# Patient Record
Sex: MALE | Race: WHITE | NOT HISPANIC OR LATINO | Employment: FULL TIME | URBAN - METROPOLITAN AREA
[De-identification: names, ages, dates, MRNs, and addresses within clinical notes are randomized per-mention and may not be internally consistent; named-entity substitution may affect disease eponyms.]

---

## 2017-03-01 ENCOUNTER — ALLSCRIPTS OFFICE VISIT (OUTPATIENT)
Dept: OTHER | Facility: OTHER | Age: 32
End: 2017-03-01

## 2017-03-22 ENCOUNTER — TRANSCRIBE ORDERS (OUTPATIENT)
Dept: ADMINISTRATIVE | Facility: HOSPITAL | Age: 32
End: 2017-03-22

## 2017-03-22 DIAGNOSIS — E27.9 UNSPECIFIED DISORDER OF ADRENAL GLANDS: Primary | ICD-10-CM

## 2017-03-31 ENCOUNTER — HOSPITAL ENCOUNTER (OUTPATIENT)
Dept: CT IMAGING | Facility: HOSPITAL | Age: 32
Discharge: HOME | End: 2017-03-31
Payer: COMMERCIAL

## 2017-03-31 ENCOUNTER — GENERIC CONVERSION - ENCOUNTER (OUTPATIENT)
Dept: OTHER | Facility: OTHER | Age: 32
End: 2017-03-31

## 2017-03-31 DIAGNOSIS — E27.9 UNSPECIFIED DISORDER OF ADRENAL GLANDS: ICD-10-CM

## 2017-03-31 PROCEDURE — 74170 CT ABD WO CNTRST FLWD CNTRST: CPT

## 2017-03-31 RX ADMIN — IOHEXOL 100 ML: 350 INJECTION, SOLUTION INTRAVENOUS at 18:31

## 2017-10-17 ENCOUNTER — GENERIC CONVERSION - ENCOUNTER (OUTPATIENT)
Dept: OTHER | Facility: OTHER | Age: 32
End: 2017-10-17

## 2017-12-29 ENCOUNTER — ALLSCRIPTS OFFICE VISIT (OUTPATIENT)
Dept: OTHER | Facility: OTHER | Age: 32
End: 2017-12-29

## 2017-12-31 ENCOUNTER — GENERIC CONVERSION - ENCOUNTER (OUTPATIENT)
Dept: OTHER | Facility: OTHER | Age: 32
End: 2017-12-31

## 2018-01-01 NOTE — PROGRESS NOTES
Assessment   1  Acute bronchitis (466 0) (J20 9)    Plan   Acute bronchitis    · Amoxicillin-Pot Clavulanate 500-125 MG Oral Tablet; TAKE 1 TABLET EVERY 12    HOURS DAILY   · Promethazine-Codeine 6 25-10 MG/5ML Oral Syrup; TAKE 5 ML EVERY 4 TO 6    HOURS AS NEEDED FOR COUGH  GERD (gastroesophageal reflux disease)    · Pantoprazole Sodium 40 MG Oral Tablet Delayed Release    Discussion/Summary      Acute bronchitis- antibiotics prescribed  cough medication prescribed  Possible side effects of new medications were reviewed with the patient/guardian today  The treatment plan was reviewed with the patient/guardian  The patient/guardian understands and agrees with the treatment plan      Chief Complaint   Pt here for persisting cough with phlegm, chest congestion, headaches  History of Present Illness   Cold Symptoms:    Nyla Lay presents with complaints of sudden onset of constant episodes of moderate cold symptoms  Episodes started 3 weeks ago  His symptoms are caused by work contact with URI  Symptoms are improved by OTC cold medications  Symptoms are worsening  Associated symptoms include productive cough,-- facial pressure,-- headache,-- ear pain,-- wheezing-- and-- shortness of breath, but-- no nasal congestion,-- no post nasal drainage,-- no sore throat,-- no weakness,-- no nausea,-- no vomiting,-- no diarrhea,-- no fever-- and-- no chills  HPI: when he laying down he can hear crackling  Cough is worse at night  having large amounts of mucus  SOB with laying down  Review of Systems        Constitutional: as noted in HPI       ENT: no sore throat  Cardiovascular: no chest pain  Respiratory: cough  ROS reviewed  Active Problems   1  Adrenal nodule (255 8) (E27 9)   2  Change in bowel habits (787 99) (R19 4)   3  Degeneration of lumbar or lumbosacral intervertebral disc (722 52) (M51 37)   4  Flu vaccine need (V04 81) (Z23)   5   GERD (gastroesophageal reflux disease) (530 81) (K21 9)   6  Need for Tdap vaccination (V06 1) (Z23)   7  Other chronic pain (338 29) (G89 29)   8  Urinary urgency (788 63) (R39 15)    Past Medical History   1  History of Chronic low back pain (724 2,338 29) (M54 5,G89 29)   2  History of Diverticulosis (562 10) (K57 90)   3  History of Hematochezia (578 1) (K92 1)   4  History of hemorrhoids (V13 89) (Z87 19)   5  History of otitis media (V12 49) (Z86 69)  Active Problems And Past Medical History Reviewed: The active problems and past medical history were reviewed and updated today  Family History   Mother    1  Family history of sarcoidosis (V19 8) (Z83 2)   2  Family history of Uterine Cancer (V16 49)  Father    3  Family history of Diagnosis unknown  Paternal Grandmother    3  Family history of Breast Cancer (V16 3)  Maternal Aunt    5  Family history of Ovarian Cancer (V16 41)  Paternal Aunt    10  Family history of Breast Cancer (V16 3)  Family History Reviewed: The family history was reviewed and updated today  Social History    · Full-time employment   ·    · Never A Smoker   · Never Drank Alcohol  The social history was reviewed and is unchanged  Surgical History   1  History of Complete Colonoscopy   2  Denied: History of Recent Surgery  Surgical History Reviewed: The surgical history was reviewed and updated today  Current Meds    1  Pantoprazole Sodium 40 MG Oral Tablet Delayed Release; take 1 tablet every day; Therapy: [id number] to (467 20 767)  Requested for: 05Apr2017; Last     Rx:05Apr2017 Ordered   2  Vicodin ES 7 5-300 MG Oral Tablet; take 1 tablet every 4 to 6 hours as needed for pain; Therapy: 39PID9442 to (Evaluate:02Jan2018); Last Rx:[id number] Ordered     The medication list was reviewed and updated today  Allergies   1   No Known Drug Allergies    Vitals    Recorded: [id number] 01:38PM   Temperature 96 7 F, Temporal   Heart Rate 92   Respiration 16   Systolic 815, Sitting Diastolic 90, Sitting   Height 6 ft    Weight 320 lb    BMI Calculated 43 4   BSA Calculated 2 6     Physical Exam        Constitutional      General appearance: Abnormal   acutely ill-- and-- appears tired  Ears, Nose, Mouth, and Throat      Otoscopic examination: Tympanic membrance translucent with normal light reflex  Canals patent without erythema  Nasal mucosa, septum, and turbinates: Abnormal        Oropharynx: Abnormal   The posterior pharynx was erythematous  Pulmonary      Respiratory effort: No increased work of breathing or signs of respiratory distress  Auscultation of lungs: Abnormal   rhonchi over both apices  diminished breath sounds over the right base  Psychiatric      Orientation to person, place and time: Normal        Mood and affect: Normal           Signatures    Electronically signed by : Radha Looney; Dec 29 2017  1:56PM EST                       (Author)     Electronically signed by :  Antwon Valladares MD; Dec 31 2017  7:57PM EST                       (Author)

## 2018-01-03 ENCOUNTER — HOSPITAL ENCOUNTER (OUTPATIENT)
Dept: RADIOLOGY | Facility: HOSPITAL | Age: 33
Discharge: HOME | End: 2018-01-03
Payer: COMMERCIAL

## 2018-01-03 ENCOUNTER — GENERIC CONVERSION - ENCOUNTER (OUTPATIENT)
Dept: OTHER | Facility: OTHER | Age: 33
End: 2018-01-03

## 2018-01-03 DIAGNOSIS — R05.9 COUGH: ICD-10-CM

## 2018-01-03 DIAGNOSIS — J20.9 ACUTE BRONCHITIS: ICD-10-CM

## 2018-01-03 PROCEDURE — 71046 X-RAY EXAM CHEST 2 VIEWS: CPT

## 2018-01-10 NOTE — MISCELLANEOUS
[employer]        To whom it may concern;        Mr Tico Wheeler is under my care for chronic back pain  His work restrictions are no lifting greater than 45 lbs, limited bending/pushing and carrying items for prolonged period of time  No snow shoveling and needs to take breaks as needed for alleviation  of pain  Your assistance in this matter is greatly appreciated  Electronically signed by: Felicia Narayanan MD  Oct 18 2017  8:19AM EST

## 2018-01-12 VITALS
HEIGHT: 72 IN | DIASTOLIC BLOOD PRESSURE: 102 MMHG | HEART RATE: 85 BPM | SYSTOLIC BLOOD PRESSURE: 122 MMHG | OXYGEN SATURATION: 98 % | WEIGHT: 315 LBS | RESPIRATION RATE: 18 BRPM | TEMPERATURE: 97 F | BODY MASS INDEX: 42.66 KG/M2

## 2018-01-13 NOTE — RESULT NOTES
Verified Results  (1) COMPREHENSIVE METABOLIC PANEL [id number] 90:71XC [facility]     Test Name Result Flag Reference   GLUCOSE 102 mg/dL H 65-99   Fasting reference interval   UREA NITROGEN (BUN) 15 mg/dL  7-25   CREATININE 0 79 mg/dL  0 60-1 35   eGFR NON-AFR   AMERICAN 120 mL/min/1 73m2  > OR = 60   eGFR AFRICAN AMERICAN 139 mL/min/1 73m2  > OR = 60   BUN/CREATININE RATIO   7-28   NOT APPLICABLE (calc)   SODIUM 140 mmol/L  135-146   POTASSIUM 4 5 mmol/L  3 5-5 3   CHLORIDE 104 mmol/L     CARBON DIOXIDE 28 mmol/L  19-30   CALCIUM 9 5 mg/dL  8 6-10 3   PROTEIN, TOTAL 7 3 g/dL  6 1-8 1   ALBUMIN 4 6 g/dL  3 6-5 1   GLOBULIN 2 7 g/dL (calc)  1 9-3 7   ALBUMIN/GLOBULIN RATIO 1 7 (calc)  1 0-2 5   BILIRUBIN, TOTAL 0 5 mg/dL  0 2-1 2   ALKALINE PHOSPHATASE 67 U/L     AST 24 U/L  10-40   ALT 39 U/L  9-46     (1) ANGIOTENSIN CONVERSIN ENZYME 21Mar2016 08:51AM [facility]     Test Name Result Flag Reference   ANGIOTENSIN-1-CONVERTING$ENZYME 34 U/L  9-67     (1) CBC/PLT/DIFF [id number] 08:51AM [facility]     Test Name Result Flag Reference   WHITE BLOOD CELL COUNT 6 3 Thousand/uL  3 8-10 8   RED BLOOD CELL COUNT 6 02 Million/uL H 4 20-5 80   HEMOGLOBIN 17 0 g/dL  13 2-17 1   HEMATOCRIT 51 6 % H 38 5-50 0   MCV 85 7 fL  80 0-100 0   MCH 28 2 pg  27 0-33 0   MCHC 33 0 g/dL  32 0-36 0   RDW 13 6 %  11 0-15 0   PLATELET COUNT 991 Thousand/uL  140-400   MPV 8 8 fL  7 5-11 5   ABSOLUTE NEUTROPHILS 3749 cells/uL  4211-9520   ABSOLUTE LYMPHOCYTES 2054 cells/uL  850-3900   ABSOLUTE MONOCYTES 410 cells/uL  200-950   ABSOLUTE EOSINOPHILS 44 cells/uL     ABSOLUTE BASOPHILS 44 cells/uL  0-200   NEUTROPHILS 59 5 %     LYMPHOCYTES 32 6 %     MONOCYTES 6 5 %     EOSINOPHILS 0 7 %     BASOPHILS 0 7 %       (Q) LIPID PANEL WITH REFLEX TO DIRECT LDL [id number] 08:51AM Mardee Finder     Test Name Result Flag Reference   CHOLESTEROL, TOTAL 169 mg/dL  125-200   HDL CHOLESTEROL 39 mg/dL L > OR = 40   TRIGLICERIDES 72 mg/dL <150   LDL-CHOLESTEROL 116 mg/dL (calc)  <130   Desirable range <100 mg/dL for patients with CHD or  diabetes and <70 mg/dL for diabetic patients with  known heart disease  CHOL/HDLC RATIO 4 3 (calc)  < OR = 5 0   NON HDL CHOLESTEROL 130 mg/dL (calc)     Target for non-HDL cholesterol is 30 mg/dL higher than   LDL cholesterol target       (Q) TSH, 3RD GENERATION [id number] 08:51AM Debrah Ahumada   REPORT COMMENT:  FASTING:YES     Test Name Result Flag Reference   TSH 1 50 mIU/L  0 40-4 50

## 2018-01-17 NOTE — RESULT NOTES
Verified Results  CT ABDOMEN W [address] [facility] [id number] 05:39PM [facility]     Test Name Result Flag Reference   CT ABDOMEN W WO CONTRAST (Report)     CT ABDOMEN - ADRENAL PROTOCOL - WITH AND WITHOUT IV CONTRAST     INDICATION: Evaluate left adrenal lesion  COMPARISON: CT scan 9/1/2015     TECHNIQUE: Thin section noncontrast CT examination of the abdomen was performed per adrenal mass protocol  Scan were performed prior to IV contrast administration, in postcontrast portal venous phase, and at 10 minute delay  This examination, like all    CT scans performed in the [facility], was performed utilizing techniques to minimize radiation dose exposure, including the use of iterative reconstruction and automated exposure control  Axial, sagittal, and coronal reformatted images   were submitted for interpretation  Rad dose 2448 mGy-cm      IV Contrast: 100 mL of iohexol (OMNIPAQUE)     Enteric Contrast: Enteric contrast was administered  FINDINGS:     ADRENAL GLANDS:    There is a left adrenal nodule measuring 2 5 x 2 3 cm unchanged  The lesion demonstrates homogeneous attenuation and circumscribed margins  Unenhanced density is -5 Hounsfield units  Parenchymal phase density measures 17 Hounsfield units  Delayed phase density measures -2 Hounsfield units  Findings consistent with stable benign left adrenal adenoma  ABDOMEN     LOWER CHEST: No significant abnormality in the lung bases  LIVER/BILIARY TREE: Unremarkable  GALLBLADDER: No calcified gallstones  No pericholecystic inflammatory change  SPLEEN: Stable splenomegaly measuring 14 5 cm  PANCREAS: Unremarkable  KIDNEYS/URETERS: Unremarkable  No hydronephrosis  VISUALIZED STOMACH AND BOWEL: Mild thickening of the duodenal wall  ABDOMINAL CAVITY: No free air, free fluid or adenopathy  VESSELS:   No aneurysm  OSSEOUS STRUCTURES: No destructive osseous lesion         IMPRESSION:     Stable benign left adrenal gland adenoma  No follow-up is necessary  Mild thickening of the duodenal wall  Clinical correlation for duodenitis is recommended  ##sigslh##sigslh       Workstation performed: [id number]     Signed by:    Manoj Salmeron MD   4/4/17

## 2018-01-23 VITALS
BODY MASS INDEX: 42.66 KG/M2 | RESPIRATION RATE: 16 BRPM | DIASTOLIC BLOOD PRESSURE: 90 MMHG | SYSTOLIC BLOOD PRESSURE: 132 MMHG | HEART RATE: 92 BPM | WEIGHT: 315 LBS | HEIGHT: 72 IN | TEMPERATURE: 96.7 F

## 2018-01-23 NOTE — RESULT NOTES
Verified Results  * XR CHEST PA & LATERAL [id number] 12:41PM Reynaldo Brito Order Number: [id number]     Test Name Result Flag Reference   XR CHEST PA & LATERAL (Report)     CHEST 2 View     INDICATION: Shortness of breath, chest tightness, cough and fever for a week  COMPARISON: None     VIEWS: PA and lateral projections; 2 images     FINDINGS:        Cardiomediastinal silhouette appears unremarkable  The lungs are clear  No pneumothorax or pleural effusion  Visualized osseous structures appear within normal limits for the patient's age  IMPRESSION:     No active pulmonary disease  Workstation performed: [id number]     Signed by:    Adan Romero MD   1/4/18

## 2018-02-09 ENCOUNTER — OFFICE VISIT (OUTPATIENT)
Dept: FAMILY MEDICINE CLINIC | Facility: CLINIC | Age: 33
End: 2018-02-09
Payer: COMMERCIAL

## 2018-02-09 VITALS
BODY MASS INDEX: 41.33 KG/M2 | WEIGHT: 305.13 LBS | HEART RATE: 92 BPM | RESPIRATION RATE: 16 BRPM | SYSTOLIC BLOOD PRESSURE: 126 MMHG | DIASTOLIC BLOOD PRESSURE: 90 MMHG | TEMPERATURE: 97.5 F | HEIGHT: 72 IN

## 2018-02-09 DIAGNOSIS — M51.37 DEGENERATION OF INTERVERTEBRAL DISC OF LUMBOSACRAL REGION: Primary | ICD-10-CM

## 2018-02-09 DIAGNOSIS — B37.0 THRUSH, ORAL: ICD-10-CM

## 2018-02-09 DIAGNOSIS — M51.37 DDD (DEGENERATIVE DISC DISEASE), LUMBOSACRAL: ICD-10-CM

## 2018-02-09 DIAGNOSIS — A37.90 WHOOPING COUGH: Primary | ICD-10-CM

## 2018-02-09 DIAGNOSIS — M51.37 DEGENERATION OF INTERVERTEBRAL DISC OF LUMBOSACRAL REGION: ICD-10-CM

## 2018-02-09 PROBLEM — R05.9 COUGH: Status: ACTIVE | Noted: 2018-01-03

## 2018-02-09 PROCEDURE — 99214 OFFICE O/P EST MOD 30 MIN: CPT | Performed by: FAMILY MEDICINE

## 2018-02-09 PROCEDURE — 3008F BODY MASS INDEX DOCD: CPT | Performed by: FAMILY MEDICINE

## 2018-02-09 RX ORDER — PREDNISONE 20 MG/1
20 TABLET ORAL DAILY
Qty: 10 TABLET | Refills: 0 | Status: SHIPPED | OUTPATIENT
Start: 2018-02-09 | End: 2018-05-08 | Stop reason: ALTCHOICE

## 2018-02-09 RX ORDER — BENZONATATE 100 MG/1
100 CAPSULE ORAL 3 TIMES DAILY PRN
COMMUNITY
End: 2018-05-08 | Stop reason: ALTCHOICE

## 2018-02-09 RX ORDER — PANTOPRAZOLE SODIUM 40 MG/1
1 TABLET, DELAYED RELEASE ORAL DAILY
COMMUNITY
Start: 2017-04-05 | End: 2018-05-08 | Stop reason: ALTCHOICE

## 2018-02-09 RX ORDER — ALBUTEROL SULFATE 90 UG/1
2 AEROSOL, METERED RESPIRATORY (INHALATION) EVERY 6 HOURS PRN
COMMUNITY
End: 2019-07-26 | Stop reason: SDUPTHER

## 2018-02-09 RX ORDER — HYDROCODONE BITARTRATE AND ACETAMINOPHEN 7.5; 3 MG/1; MG/1
1 TABLET ORAL EVERY 6 HOURS PRN
Qty: 100 TABLET | Refills: 0 | Status: SHIPPED | OUTPATIENT
Start: 2018-02-09 | End: 2018-04-23 | Stop reason: SDUPTHER

## 2018-02-09 RX ORDER — HYDROCODONE BITARTRATE AND ACETAMINOPHEN 7.5; 3 MG/1; MG/1
1 TABLET ORAL
COMMUNITY
Start: 2013-04-09 | End: 2018-02-09 | Stop reason: SDUPTHER

## 2018-02-09 RX ORDER — CLARITHROMYCIN 500 MG/1
500 TABLET, COATED ORAL EVERY 12 HOURS SCHEDULED
Qty: 20 TABLET | Refills: 0 | Status: SHIPPED | OUTPATIENT
Start: 2018-02-09 | End: 2018-02-19

## 2018-02-09 RX ORDER — HYDROCODONE BITARTRATE AND ACETAMINOPHEN 7.5; 3 MG/1; MG/1
1 TABLET ORAL EVERY 8 HOURS PRN
Qty: 180 TABLET | Refills: 0 | Status: SHIPPED | OUTPATIENT
Start: 2018-02-09 | End: 2018-02-09 | Stop reason: SDUPTHER

## 2018-02-09 NOTE — PROGRESS NOTES
Assessment/Plan:    DDD (degenerative disc disease), lumbosacral  Would like referral to pain management per workman's comp       Diagnoses and all orders for this visit:    Whooping cough  -     clarithromycin (BIAXIN) 500 mg tablet; Take 1 tablet (500 mg total) by mouth every 12 (twelve) hours for 10 days  -     predniSONE 20 mg tablet; Take 1 tablet (20 mg total) by mouth daily    Thrush, oral  -     nystatin (MYCOSTATIN) 100,000 units/mL suspension; Apply 5 mL (500,000 Units total) to the mouth or throat 4 (four) times a day    DDD (degenerative disc disease), lumbosacral    Other orders  -     HYDROcodone-acetaminophen (VICODIN ES) 7 5-300 MG per tablet; Take 1 tablet by mouth  -     pantoprazole (PROTONIX) 40 mg tablet; Take 1 tablet by mouth daily  -     benzonatate (TESSALON PERLES) 100 mg capsule; Take 100 mg by mouth 3 (three) times a day as needed for cough  -     albuterol (PROVENTIL HFA,VENTOLIN HFA) 90 mcg/act inhaler; Inhale 2 puffs every 6 (six) hours as needed for wheezing          Subjective:      Patient ID: Maureen Mcmahan is a 34 y o  male  Cough   This is a chronic problem  The current episode started more than 1 month ago  The problem has been gradually worsening  The problem occurs every few minutes  The cough is non-productive  Associated symptoms include nasal congestion, postnasal drip, a sore throat and shortness of breath  Pertinent negatives include no chills, ear congestion, ear pain, fever, headaches, heartburn or hemoptysis  Associated symptoms comments: Headaches and sore throat from coughing  The symptoms are aggravated by cold air  Risk factors for lung disease include 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)  He has tried a beta-agonist inhaler and prescription cough suppressant (antibiotics) for the symptoms  The treatment provided mild relief         The following portions of the patient's history were reviewed and updated as appropriate: allergies, current medications, past family history, past medical history, past social history, past surgical history and problem list     Review of Systems   Constitutional: Positive for unexpected weight change  Negative for chills and fever  15 lb   HENT: Positive for postnasal drip and sore throat  Negative for ear pain  Respiratory: Positive for cough and shortness of breath  Negative for hemoptysis  Gastrointestinal: Negative for abdominal pain and heartburn  Musculoskeletal: Positive for back pain  Neurological: Negative for headaches  Hematological: Negative for adenopathy  Objective:    Vitals:    02/09/18 1434   BP: 126/90   Pulse: 92   Resp: 16   Temp: 97 5 °F (36 4 °C)        Physical Exam   Constitutional: He appears well-developed and well-nourished  HENT:   Right Ear: External ear normal    Left Ear: External ear normal    Mouth/Throat: Oropharynx is clear and moist    Oral thrush   Cardiovascular: Normal rate and regular rhythm  Pulmonary/Chest: He has wheezes  Scattered expiratory wheezes   Lymphadenopathy:     He has no cervical adenopathy

## 2018-02-26 ENCOUNTER — TELEPHONE (OUTPATIENT)
Dept: FAMILY MEDICINE CLINIC | Facility: CLINIC | Age: 33
End: 2018-02-26

## 2018-02-26 NOTE — TELEPHONE ENCOUNTER
Pt stated all he needs for the hydrocodone is your last few office visit notes for back pain faxed over to insurance company since they dont have any resent documentation  Faxed [phone], phone number [phone] ext [phone]

## 2018-02-26 NOTE — TELEPHONE ENCOUNTER
If not better needs to be re-evaluated, reason why he started hydrocodone might even be in paper records but we were not his workman's comp doc at that time

## 2018-02-26 NOTE — TELEPHONE ENCOUNTER
I called patient because we received correspondence from his workers comp claim that they need documentation when and why he started the hydrocodone  I need Kwesi Morejon to access [employer] to see when he started  Pt still complains he is not feeling better  He finished the abx, steriods, and still w/ chest discomfort and cough and thrush has not cleared up  Does he need to give it time to clear up? Pls advise

## 2018-03-12 ENCOUNTER — TELEPHONE (OUTPATIENT)
Dept: FAMILY MEDICINE CLINIC | Facility: CLINIC | Age: 33
End: 2018-03-12

## 2018-03-12 NOTE — TELEPHONE ENCOUNTER
Spoke w/ patient regarding his wc claim   Info is below     Claim#[id number]  Date- 07/14/2010  - mary schultz w/ [address]  Direct [phone]  Toll free- [phone] ext [phone]

## 2018-03-13 ENCOUNTER — HOSPITAL ENCOUNTER (OUTPATIENT)
Dept: MRI IMAGING | Facility: HOSPITAL | Age: 33
Discharge: HOME | End: 2018-03-13
Payer: OTHER MISCELLANEOUS

## 2018-03-13 DIAGNOSIS — M51.37 DDD (DEGENERATIVE DISC DISEASE), LUMBOSACRAL: ICD-10-CM

## 2018-03-13 PROCEDURE — 72148 MRI LUMBAR SPINE W/O DYE: CPT

## 2018-03-14 ENCOUNTER — TELEPHONE (OUTPATIENT)
Dept: FAMILY MEDICINE CLINIC | Facility: CLINIC | Age: 33
End: 2018-03-14

## 2018-03-14 NOTE — TELEPHONE ENCOUNTER
Patient notified         ----- Message from Louis Lai MD sent at 3/14/2018 12:47 PM EDT -----  Herniated lumbar disc -about the same as before, perhaps slightly smaller

## 2018-04-23 DIAGNOSIS — M51.37 DEGENERATION OF INTERVERTEBRAL DISC OF LUMBOSACRAL REGION: ICD-10-CM

## 2018-04-23 RX ORDER — HYDROCODONE BITARTRATE AND ACETAMINOPHEN 7.5; 3 MG/1; MG/1
1 TABLET ORAL EVERY 6 HOURS PRN
Qty: 100 TABLET | Refills: 0 | Status: SHIPPED | OUTPATIENT
Start: 2018-04-23 | End: 2018-08-08 | Stop reason: SDUPTHER

## 2018-05-08 ENCOUNTER — CONSULT (OUTPATIENT)
Dept: PAIN MEDICINE | Facility: MEDICAL CENTER | Age: 33
End: 2018-05-08
Payer: OTHER MISCELLANEOUS

## 2018-05-08 VITALS
DIASTOLIC BLOOD PRESSURE: 88 MMHG | RESPIRATION RATE: 14 BRPM | HEART RATE: 72 BPM | WEIGHT: 314.2 LBS | BODY MASS INDEX: 42.56 KG/M2 | HEIGHT: 72 IN | SYSTOLIC BLOOD PRESSURE: 136 MMHG

## 2018-05-08 DIAGNOSIS — M51.37 DDD (DEGENERATIVE DISC DISEASE), LUMBOSACRAL: Primary | ICD-10-CM

## 2018-05-08 DIAGNOSIS — M51.26 LUMBAR DISC HERNIATION: ICD-10-CM

## 2018-05-08 PROCEDURE — 99244 OFF/OP CNSLTJ NEW/EST MOD 40: CPT | Performed by: PHYSICAL MEDICINE & REHABILITATION

## 2018-05-08 NOTE — LETTER
May 8, 2018     Cale Larson, [address]    Patient: Leonardo Carl   YOB: 1985   Date of Visit: 5/8/2018       Dear Dr Briscoe Sic:    Thank you for referring Leonardo Carl to me for evaluation  Below are my notes for this consultation  If you have questions, please do not hesitate to call me  I look forward to following your patient along with you  Sincerely,        Ji Shen DO        CC: No Recipients  Ji Shen DO  5/8/2018 11:48 AM  Sign at close encounter  Assessment:  1  DDD (degenerative disc disease), lumbosacral    2  Lumbar disc herniation        Plan:   The patient has been experiencing axial back pain since a work injury which occurred on July 14, 2010 per his report  He states he slipped and twisted and hurt his back at that time  An MRI from 2012 demonstrates L4-5 and L5-S1 disc herniations  An updated MRI demonstrates some improvement in the size of these herniations  He has no compression on the exiting nerve roots at these levels and is experiencing mainly axial pain  Surgery is typically not rewarding for these types of complaints and likely would not be recommended to him  I did review with him recommendations for future care  These are as follows:    1  He may use occasional over-the-counter NSAIDs and Tylenol to manage his pain complaints  He should not be managed long-term on opioid medications  He has been using Vicodin for quite a while and this should be discontinued  Another option would be to consider a trial of duloxetine  2   Strongly recommend weight loss whether this is through diet restriction and exercise or referral for medical weight loss management strategies  3   Could consider return to physical therapy  4   We also discussed the option of vocational training to look for a different type of work that is more sedentary    Clearly 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 in the future  5   I discussed with him that he should follow up with me with any worsening of symptoms or any radiating leg pain for which we may consider epidural steroid injections  Currently these are not indicated as he is only experiencing axial pain  My impressions and treatment recommendations were discussed in detail with the patient who verbalized understanding and had no further questions  Discharge instructions were provided  I personally saw and examined the patient and I agree with the above discussed plan of care  History of Present Illness:    Jason Schilling is a 28 y o  male  Sent from Dr Joyce Sheridan for further evaluation and management of his chronic back pain complaints which have been present since he injured his back at work on July 14, 2010  He states that he slipped and twisted his back  He had undergone care at [facility] as well as [facility]  He states he underwent facet joint injections and there was some discussion about radiofrequency ablation but he did not undergo that  He was not offered epidural steroid injections based on lack of radicular symptoms  He did meet with the surgeon as well who did not recommend any surgical treatment  He currently describes moderate pain rated as a 3 to 5/10 which is nearly constant without any typical pattern and is characterized as shooting, numbness, sharp, dull, aching, pins and needles  He localizes this to the lower lumbar axial spine without radiation  Aggravating factors include bending, exercise, coughing, sneezing, and bowel movements  He states that he did not have any relief with injections and felt that that actually made his pain worse  He did have some moderate relief with a TENS unit and local heat or ice application  Currently using hydrocodone up to 4 per day which she states does provide some relief of his pain  He is aware that this is a poor option for managing the pain chronically      I have personally reviewed and/or updated the patient's past medical history, past surgical history, family history, social history, current medications, allergies, and vital signs today  Review of Systems:    Review of Systems   Constitutional: Negative for fever and unexpected weight change  HENT: Negative for trouble swallowing  Eyes: Negative for visual disturbance  Respiratory: Negative for shortness of breath and wheezing  Cardiovascular: Negative for chest pain and palpitations  Gastrointestinal: Negative for constipation, diarrhea, nausea and vomiting  Endocrine: Negative for cold intolerance, heat intolerance and polydipsia  Genitourinary: Negative for difficulty urinating and frequency  Musculoskeletal: Positive for back pain and gait problem  Negative for arthralgias, joint swelling and myalgias  Skin: Negative for rash  Neurological: Negative for dizziness, seizures, syncope, weakness and headaches  Hematological: Does not bruise/bleed easily  Psychiatric/Behavioral: Negative for dysphoric mood  All other systems reviewed and are negative  Patient Active Problem List   Diagnosis    Cough    DDD (degenerative disc disease), lumbosacral    GERD (gastroesophageal reflux disease)    Urinary urgency       Past Medical History:   Diagnosis Date    Back pain        No past surgical history on file      Family History   Problem Relation Age of Onset    Mental illness Mother     No Known Problems Father     Diabetes type II Maternal Grandmother     Vision loss Maternal Grandmother     Heart disease Maternal Grandfather        Social History     Occupational History          Social History Main Topics    Smoking status: Never Smoker    Smokeless tobacco: Never Used    Alcohol use No    Drug use: No    Sexual activity: Yes     Partners: Female       Current Outpatient Prescriptions on File Prior to Visit   Medication Sig    albuterol (PROVENTIL HFA,VENTOLIN HFA) 90 mcg/act inhaler Inhale 2 puffs every 6 (six) hours as needed for wheezing    HYDROcodone-acetaminophen (VICODIN ES) 7 5-300 MG per tablet Take 1 tablet by mouth every 6 (six) hours as needed for moderate pain Max Daily Amount: 4 tablets    [DISCONTINUED] benzonatate (TESSALON PERLES) 100 mg capsule Take 100 mg by mouth 3 (three) times a day as needed for cough    [DISCONTINUED] nystatin (MYCOSTATIN) 100,000 units/mL suspension Apply 5 mL (500,000 Units total) to the mouth or throat 4 (four) times a day    [DISCONTINUED] pantoprazole (PROTONIX) 40 mg tablet Take 1 tablet by mouth daily    [DISCONTINUED] predniSONE 20 mg tablet Take 1 tablet (20 mg total) by mouth daily     No current facility-administered medications on file prior to visit  No Known Allergies    Physical Exam:    /88   Pulse 72   Resp 14   Ht 6' (1 829 m)   Wt (!) 143 kg (314 lb 3 2 oz)   BMI 42 61 kg/m²        LUMBAR  General: Well-developed, well-nourished individual in no acute distress  Mental: Appropriate mood and affect  Grossly oriented with coherent speech and thought processing   Neuro:  Cranial nerves: Cranial nerve function is grossly intact bilaterally   Strength: Bilateral lower extremity strength is normal and symmetric   No atrophy or tone abnormalities noted   Reflexes: Bilateral lower extremity muscle stretch reflexes are physiologic and symmetric   No ankle clonus is noted   Sensation: No loss of sensation is noted   SLR/Foraminal Compression Maneuvers: Straight leg raising in the sitting position is negative for radicular pain   Gait:  Gait/gross motor: Gait is normal  Station is normal  Toe walking, heel walking  are normal     Musculoskeletal:  Palpation: Inspection and palpation of the spine and extremities are unremarkable   Spine: Normal pain-free range of motion except for end range forward flexion which reproduces pain  No gross axial skeletal deformities       Skin: Skin inspection grossly negative for erythema, breakdown, or concerning lesions in affected area   Lymph: No lymphadenopathy is appreciated in the involved extremity   Vessels: No lower extremity edema   Lungs: Breathing is comfortable and regular  No dyspnea noted during examination   Eyes: Visual field grossly intact to confrontation  No redness appreciated  ENT: No craniofacial deformities or asymmetry  No neck masses appreciated  Imaging    MRI LUMBAR SPINE WITHOUT CONTRAST     INDICATION:  39-year-old male, chronic low back pain; remote trauma     COMPARISON:  12/22/2012 outside MRI     TECHNIQUE:  Sagittal T1, sagittal T2, sagittal inversion recovery, axial T1 and axial T2, coronal T2        IMAGE QUALITY:  Diagnostic     FINDINGS:     ALIGNMENT:  Normal alignment of the lumbar spine  No compression fracture  No spondylolysis or spondylolisthesis  No scoliosis      MARROW SIGNAL:  Normal marrow signal is identified within the visualized bony structures  No discrete marrow lesion      DISTAL CORD AND CONUS:  Normal size and signal within the distal cord and conus  The conus ends at the L1 level      PARASPINAL SOFT TISSUES:  Paraspinal soft tissues are unremarkable      SACRUM:  Normal signal within the sacrum  No evidence of insufficiency or stress fracture      LOWER THORACIC DISC SPACES:  Normal disc height and signal   No disc herniation, canal stenosis or foraminal narrowing      LUMBAR DISC SPACES:     L1-L2:  Normal      L2-L3:  Normal      L3-L4:  Normal      L4-L5:  Mild degenerative disc and facet disease, moderate left-sided disc herniation with probable left L5 nerve root encroachment  The disc herniation is slightly smaller than previous      L5-S1:  Mild degenerative disc and facet disease, moderate left-sided disc herniation with probable left S1 nerve root encroachment    The disc herniation appears slightly smaller than previous      IMPRESSION:  Persistent mild degenerative spondylosis L4-5 and L5-S1     Moderate sized left-sided disc herniations L4-5 and L5-S1, slightly smaller than previous

## 2018-05-08 NOTE — PROGRESS NOTES
Assessment:  1  DDD (degenerative disc disease), lumbosacral    2  Lumbar disc herniation        Plan:   The patient has been experiencing axial back pain since a work injury which occurred on July 14, 2010 per his report  He states he slipped and twisted and hurt his back at that time  An MRI from 2012 demonstrates L4-5 and L5-S1 disc herniations  An updated MRI demonstrates some improvement in the size of these herniations  He has no compression on the exiting nerve roots at these levels and is experiencing mainly axial pain  Surgery is typically not rewarding for these types of complaints and likely would not be recommended to him  I did review with him recommendations for future care  These are as follows:    1  He may use occasional over-the-counter NSAIDs and Tylenol to manage his pain complaints  He should not be managed long-term on opioid medications  He has been using Vicodin for quite a while and this should be discontinued  Another option would be to consider a trial of duloxetine  2   Strongly recommend weight loss whether this is through diet restriction and exercise or referral for medical weight loss management strategies  3   Could consider return to physical therapy  4   We also discussed the option of vocational training to look for a different type of work that is more sedentary  Clearly 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 in the future  5   I discussed with him that he should follow up with me with any worsening of symptoms or any radiating leg pain for which we may consider epidural steroid injections  Currently these are not indicated as he is only experiencing axial pain  My impressions and treatment recommendations were discussed in detail with the patient who verbalized understanding and had no further questions  Discharge instructions were provided   I personally saw and examined the patient and I agree with the above discussed plan of care       History of Present Illness:    Rosmery Cooley is a 28 y o  male  Sent from Dr Ulices Mosqueda for further evaluation and management of his chronic back pain complaints which have been present since he injured his back at work on July 14, 2010  He states that he slipped and twisted his back  [facility]  He states he underwent facet joint injections and there was some discussion about radiofrequency ablation but he did not undergo that  He was not offered epidural steroid injections based on lack of radicular symptoms  He did meet with the surgeon as well who did not recommend any surgical treatment  He currently describes moderate pain rated as a 3 to 5/10 which is nearly constant without any typical pattern and is characterized as shooting, numbness, sharp, dull, aching, pins and needles  He localizes this to the lower lumbar axial spine without radiation  Aggravating factors include bending, exercise, coughing, sneezing, and bowel movements  He states that he did not have any relief with injections and felt that that actually made his pain worse  He did have some moderate relief with a TENS unit and local heat or ice application  Currently using hydrocodone up to 4 per day which she states does provide some relief of his pain  He is aware that this is a poor option for managing the pain chronically  I have personally reviewed and/or updated the patient's past medical history, past surgical history, family history, social history, current medications, allergies, and vital signs today  Review of Systems:    Review of Systems   Constitutional: Negative for fever and unexpected weight change  HENT: Negative for trouble swallowing  Eyes: Negative for visual disturbance  Respiratory: Negative for shortness of breath and wheezing  Cardiovascular: Negative for chest pain and palpitations     Gastrointestinal: Negative for constipation, diarrhea, nausea and vomiting  Endocrine: Negative for cold intolerance, heat intolerance and polydipsia  Genitourinary: Negative for difficulty urinating and frequency  Musculoskeletal: Positive for back pain and gait problem  Negative for arthralgias, joint swelling and myalgias  Skin: Negative for rash  Neurological: Negative for dizziness, seizures, syncope, weakness and headaches  Hematological: Does not bruise/bleed easily  Psychiatric/Behavioral: Negative for dysphoric mood  All other systems reviewed and are negative  Patient Active Problem List   Diagnosis    Cough    DDD (degenerative disc disease), lumbosacral    GERD (gastroesophageal reflux disease)    Urinary urgency       Past Medical History:   Diagnosis Date    Back pain        No past surgical history on file      Family History   Problem Relation Age of Onset    Mental illness Mother     No Known Problems Father     Diabetes type II Maternal Grandmother     Vision loss Maternal Grandmother     Heart disease Maternal Grandfather        Social History     Occupational History          Social History Main Topics    Smoking status: Never Smoker    Smokeless tobacco: Never Used    Alcohol use No    Drug use: No    Sexual activity: Yes     Partners: Female       Current Outpatient Prescriptions on File Prior to Visit   Medication Sig    albuterol (PROVENTIL HFA,VENTOLIN HFA) 90 mcg/act inhaler Inhale 2 puffs every 6 (six) hours as needed for wheezing    HYDROcodone-acetaminophen (VICODIN ES) 7 5-300 MG per tablet Take 1 tablet by mouth every 6 (six) hours as needed for moderate pain Max Daily Amount: 4 tablets    [DISCONTINUED] benzonatate (TESSALON PERLES) 100 mg capsule Take 100 mg by mouth 3 (three) times a day as needed for cough    [DISCONTINUED] nystatin (MYCOSTATIN) 100,000 units/mL suspension Apply 5 mL (500,000 Units total) to the mouth or throat 4 (four) times a day    [DISCONTINUED] pantoprazole (PROTONIX) 40 mg tablet Take 1 tablet by mouth daily    [DISCONTINUED] predniSONE 20 mg tablet Take 1 tablet (20 mg total) by mouth daily     No current facility-administered medications on file prior to visit  No Known Allergies    Physical Exam:    /88   Pulse 72   Resp 14   Ht 6' (1 829 m)   Wt (!) 143 kg (314 lb 3 2 oz)   BMI 42 61 kg/m²       LUMBAR  General: Well-developed, well-nourished individual in no acute distress  Mental: Appropriate mood and affect  Grossly oriented with coherent speech and thought processing   Neuro:  Cranial nerves: Cranial nerve function is grossly intact bilaterally   Strength: Bilateral lower extremity strength is normal and symmetric   No atrophy or tone abnormalities noted   Reflexes: Bilateral lower extremity muscle stretch reflexes are physiologic and symmetric   No ankle clonus is noted   Sensation: No loss of sensation is noted   SLR/Foraminal Compression Maneuvers: Straight leg raising in the sitting position is negative for radicular pain   Gait:  Gait/gross motor: Gait is normal  Station is normal  Toe walking, heel walking  are normal     Musculoskeletal:  Palpation: Inspection and palpation of the spine and extremities are unremarkable   Spine: Normal pain-free range of motion except for end range forward flexion which reproduces pain  No gross axial skeletal deformities   Skin: Skin inspection grossly negative for erythema, breakdown, or concerning lesions in affected area   Lymph: No lymphadenopathy is appreciated in the involved extremity   Vessels: No lower extremity edema   Lungs: Breathing is comfortable and regular  No dyspnea noted during examination   Eyes: Visual field grossly intact to confrontation  No redness appreciated  ENT: No craniofacial deformities or asymmetry  No neck masses appreciated           Imaging    MRI LUMBAR SPINE WITHOUT CONTRAST     INDICATION:  34year-old male, chronic low back pain; remote trauma     COMPARISON:  12/22/2012 outside MRI     TECHNIQUE:  Sagittal T1, sagittal T2, sagittal inversion recovery, axial T1 and axial T2, coronal T2        IMAGE QUALITY:  Diagnostic     FINDINGS:     ALIGNMENT:  Normal alignment of the lumbar spine  No compression fracture  No spondylolysis or spondylolisthesis  No scoliosis      MARROW SIGNAL:  Normal marrow signal is identified within the visualized bony structures  No discrete marrow lesion      DISTAL CORD AND CONUS:  Normal size and signal within the distal cord and conus  The conus ends at the L1 level      PARASPINAL SOFT TISSUES:  Paraspinal soft tissues are unremarkable      SACRUM:  Normal signal within the sacrum  No evidence of insufficiency or stress fracture      LOWER THORACIC DISC SPACES:  Normal disc height and signal   No disc herniation, canal stenosis or foraminal narrowing      LUMBAR DISC SPACES:     L1-L2:  Normal      L2-L3:  Normal      L3-L4:  Normal      L4-L5:  Mild degenerative disc and facet disease, moderate left-sided disc herniation with probable left L5 nerve root encroachment  The disc herniation is slightly smaller than previous      L5-S1:  Mild degenerative disc and facet disease, moderate left-sided disc herniation with probable left S1 nerve root encroachment    The disc herniation appears slightly smaller than previous      IMPRESSION:  Persistent mild degenerative spondylosis L4-5 and L5-S1     Moderate sized left-sided disc herniations L4-5 and L5-S1, slightly smaller than previous

## 2018-08-08 DIAGNOSIS — M51.37 DEGENERATION OF INTERVERTEBRAL DISC OF LUMBOSACRAL REGION: ICD-10-CM

## 2018-08-08 RX ORDER — HYDROCODONE BITARTRATE AND ACETAMINOPHEN 7.5; 3 MG/1; MG/1
1 TABLET ORAL EVERY 6 HOURS PRN
Qty: 100 TABLET | Refills: 0 | Status: SHIPPED | OUTPATIENT
Start: 2018-08-08 | End: 2018-10-03 | Stop reason: SDUPTHER

## 2018-08-08 NOTE — TELEPHONE ENCOUNTER
Pt called for med refill for his HYDROcodone-acetaminophen 7 5-300mg   Pt last ov 2/9/18 last bw 3/21/16

## 2018-09-19 ENCOUNTER — TELEPHONE (OUTPATIENT)
Dept: FAMILY MEDICINE CLINIC | Facility: CLINIC | Age: 33
End: 2018-09-19

## 2018-09-19 NOTE — TELEPHONE ENCOUNTER
Pt needs a note for work restrictions due to back injury pt will like to know if you can redo the same letter form last year or will he have to come in for a re eval please advise

## 2018-10-03 ENCOUNTER — OFFICE VISIT (OUTPATIENT)
Dept: FAMILY MEDICINE CLINIC | Facility: CLINIC | Age: 33
End: 2018-10-03
Payer: OTHER MISCELLANEOUS

## 2018-10-03 VITALS
HEART RATE: 68 BPM | BODY MASS INDEX: 42.18 KG/M2 | HEIGHT: 72 IN | SYSTOLIC BLOOD PRESSURE: 128 MMHG | WEIGHT: 311.38 LBS | DIASTOLIC BLOOD PRESSURE: 92 MMHG | RESPIRATION RATE: 18 BRPM | TEMPERATURE: 98 F

## 2018-10-03 DIAGNOSIS — M51.37 DDD (DEGENERATIVE DISC DISEASE), LUMBOSACRAL: Primary | ICD-10-CM

## 2018-10-03 DIAGNOSIS — M51.37 DEGENERATION OF INTERVERTEBRAL DISC OF LUMBOSACRAL REGION: ICD-10-CM

## 2018-10-03 PROBLEM — IMO0002 DEGENERATION OF INTERVERTEBRAL DISC: Status: RESOLVED | Noted: 2018-10-03 | Resolved: 2018-10-03

## 2018-10-03 PROBLEM — IMO0002 DEGENERATION OF INTERVERTEBRAL DISC: Status: ACTIVE | Noted: 2018-10-03

## 2018-10-03 PROBLEM — R05.9 COUGH: Status: RESOLVED | Noted: 2018-01-03 | Resolved: 2018-10-03

## 2018-10-03 PROCEDURE — 99213 OFFICE O/P EST LOW 20 MIN: CPT | Performed by: FAMILY MEDICINE

## 2018-10-03 PROCEDURE — 80361 OPIATES 1 OR MORE: CPT | Performed by: FAMILY MEDICINE

## 2018-10-03 PROCEDURE — G0480 DRUG TEST DEF 1-7 CLASSES: HCPCS | Performed by: FAMILY MEDICINE

## 2018-10-03 RX ORDER — HYDROCODONE BITARTRATE AND ACETAMINOPHEN 7.5; 3 MG/1; MG/1
1 TABLET ORAL EVERY 6 HOURS PRN
Qty: 100 TABLET | Refills: 0 | Status: SHIPPED | OUTPATIENT
Start: 2018-10-03 | End: 2019-01-17 | Stop reason: SDUPTHER

## 2018-10-03 RX ORDER — CYCLOBENZAPRINE HCL 5 MG
5 TABLET ORAL 3 TIMES DAILY PRN
Qty: 60 TABLET | Refills: 2 | Status: SHIPPED | OUTPATIENT
Start: 2018-10-03 | End: 2019-11-05 | Stop reason: SDUPTHER

## 2018-10-03 NOTE — LETTER
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

## 2018-10-03 NOTE — PROGRESS NOTES
Assessment/Plan:    DDD (degenerative disc disease), lumbosacral  Refill meds, needs urine drug screen and new pain managemnt       Diagnoses and all orders for this visit:    DDD (degenerative disc disease), lumbosacral  -     Hydrocodone and Metabolites, Urine    Degeneration of intervertebral disc of lumbosacral region  -     HYDROcodone-acetaminophen (VICODIN ES) 7 5-300 MG per tablet; Take 1 tablet by mouth every 6 (six) hours as needed for moderate pain Max Daily Amount: 4 tablets  -     Hydrocodone and Metabolites, Urine          Subjective:      Patient ID: Ernst Malone is a 28 y o  male  F/u chronic back pain, needs refill on med and work note with restrictions, pain is stable on meds but still gets pain-? Whether adding muscle relaxer would help        The following portions of the patient's history were reviewed and updated as appropriate: allergies, current medications, past family history, past medical history, past social history, past surgical history and problem list     Review of Systems   Constitutional: Positive for activity change and unexpected weight change  Negative for appetite change  Lost 3 lb   Respiratory: Negative for shortness of breath  Cardiovascular: Negative for chest pain  Musculoskeletal: Positive for back pain  Neurological: Negative for numbness  Objective:      /92 (BP Location: Right arm, Patient Position: Sitting, Cuff Size: Standard)   Pulse 68   Temp 98 °F (36 7 °C) (Temporal)   Resp 18   Ht 6' (1 829 m)   Wt (!) 141 kg (311 lb 6 oz)   BMI 42 23 kg/m²          Physical Exam   Constitutional: He appears well-developed and well-nourished  Neck: No thyromegaly present  Cardiovascular: Normal rate and regular rhythm  Pulmonary/Chest: Breath sounds normal    Musculoskeletal: He exhibits tenderness  Lumbar back: He exhibits decreased range of motion, tenderness and spasm  Vitals reviewed

## 2018-10-11 ENCOUNTER — TELEPHONE (OUTPATIENT)
Dept: FAMILY MEDICINE CLINIC | Facility: CLINIC | Age: 33
End: 2018-10-11

## 2018-10-11 LAB
HYDROCODONE UR QL: NEGATIVE NG/ML
HYDROMORPHONE UR QL: NEGATIVE NG/ML

## 2018-10-11 NOTE — TELEPHONE ENCOUNTER
----- Message from Braeden Hernández MD sent at 10/11/2018  3:33 PM EDT -----  Urine negative for rxed meds-when was last dose before urine drug screen?

## 2018-10-12 DIAGNOSIS — M51.36 DEGENERATIVE LUMBAR DISC: Primary | ICD-10-CM

## 2018-10-12 PROCEDURE — G0480 DRUG TEST DEF 1-7 CLASSES: HCPCS | Performed by: FAMILY MEDICINE

## 2018-10-12 PROCEDURE — 80361 OPIATES 1 OR MORE: CPT | Performed by: FAMILY MEDICINE

## 2018-10-12 NOTE — TELEPHONE ENCOUNTER
Per patient he normally takes his medication every day at 9:00pm before bed time and he might take 1 during the day if needed  Patient also mentioned he takes 1-2 gallons of water a day

## 2018-10-19 ENCOUNTER — TELEPHONE (OUTPATIENT)
Dept: FAMILY MEDICINE CLINIC | Facility: CLINIC | Age: 33
End: 2018-10-19

## 2018-10-19 LAB
HYDROCODONE UR QL: 567 NG/ML
HYDROMORPHONE UR QL: 81 NG/ML

## 2018-10-19 NOTE — TELEPHONE ENCOUNTER
----- Message from Remy Rodriguez MD sent at 10/19/2018  2:14 PM EDT -----  Urine consistent with rxed meds

## 2019-01-17 DIAGNOSIS — M51.37 DEGENERATION OF INTERVERTEBRAL DISC OF LUMBOSACRAL REGION: ICD-10-CM

## 2019-01-17 RX ORDER — HYDROCODONE BITARTRATE AND ACETAMINOPHEN 7.5; 3 MG/1; MG/1
1 TABLET ORAL EVERY 6 HOURS PRN
Qty: 100 TABLET | Refills: 0 | Status: SHIPPED | OUTPATIENT
Start: 2019-01-17 | End: 2019-07-01 | Stop reason: SDUPTHER

## 2019-01-17 NOTE — TELEPHONE ENCOUNTER
Patient called in requesting medication refill on his Vicodin to be sent o [facility] on [address]    Last ov was 10/3/2018

## 2019-06-28 DIAGNOSIS — M51.37 DEGENERATION OF INTERVERTEBRAL DISC OF LUMBOSACRAL REGION: ICD-10-CM

## 2019-06-28 RX ORDER — HYDROCODONE BITARTRATE AND ACETAMINOPHEN 7.5; 3 MG/1; MG/1
1 TABLET ORAL EVERY 6 HOURS PRN
Qty: 100 TABLET | Refills: 0 | OUTPATIENT
Start: 2019-06-28

## 2019-07-01 DIAGNOSIS — M51.37 DEGENERATION OF INTERVERTEBRAL DISC OF LUMBOSACRAL REGION: ICD-10-CM

## 2019-07-01 RX ORDER — HYDROCODONE BITARTRATE AND ACETAMINOPHEN 7.5; 3 MG/1; MG/1
1 TABLET ORAL EVERY 6 HOURS PRN
Qty: 60 TABLET | Refills: 0 | Status: SHIPPED | OUTPATIENT
Start: 2019-07-01 | End: 2019-11-05 | Stop reason: SDUPTHER

## 2019-07-01 NOTE — TELEPHONE ENCOUNTER
Patient scheduled an appointment on 7/26/2019  Patient stated if he needs to give urine he will be out of his meds by then  I offered and earlier appointment but he cant take off from work

## 2019-07-26 ENCOUNTER — OFFICE VISIT (OUTPATIENT)
Dept: FAMILY MEDICINE CLINIC | Facility: CLINIC | Age: 34
End: 2019-07-26
Payer: OTHER MISCELLANEOUS

## 2019-07-26 ENCOUNTER — TELEPHONE (OUTPATIENT)
Dept: FAMILY MEDICINE CLINIC | Facility: CLINIC | Age: 34
End: 2019-07-26

## 2019-07-26 VITALS
DIASTOLIC BLOOD PRESSURE: 94 MMHG | HEART RATE: 88 BPM | BODY MASS INDEX: 42.66 KG/M2 | HEIGHT: 72 IN | WEIGHT: 315 LBS | RESPIRATION RATE: 18 BRPM | TEMPERATURE: 98.7 F | SYSTOLIC BLOOD PRESSURE: 132 MMHG

## 2019-07-26 DIAGNOSIS — E66.01 MORBID OBESITY WITH BMI OF 40.0-44.9, ADULT (HCC): ICD-10-CM

## 2019-07-26 DIAGNOSIS — J98.01 BRONCHOSPASM: ICD-10-CM

## 2019-07-26 DIAGNOSIS — M51.37 DDD (DEGENERATIVE DISC DISEASE), LUMBOSACRAL: Primary | ICD-10-CM

## 2019-07-26 PROCEDURE — 99213 OFFICE O/P EST LOW 20 MIN: CPT | Performed by: FAMILY MEDICINE

## 2019-07-26 RX ORDER — GABAPENTIN 300 MG/1
CAPSULE ORAL
COMMUNITY
End: 2019-07-26 | Stop reason: CLARIF

## 2019-07-26 RX ORDER — PIROXICAM 10 MG/1
CAPSULE ORAL
COMMUNITY
End: 2019-07-26 | Stop reason: CLARIF

## 2019-07-26 RX ORDER — ALBUTEROL SULFATE 90 UG/1
2 AEROSOL, METERED RESPIRATORY (INHALATION) EVERY 6 HOURS PRN
Qty: 1 INHALER | Refills: 5 | Status: SHIPPED | OUTPATIENT
Start: 2019-07-26 | End: 2020-08-11

## 2019-07-26 NOTE — PATIENT INSTRUCTIONS
Continue meds, UDS and pain management contract [facility]  Obesity   AMBULATORY CARE:   Obesity  is when your body mass index (BMI) is greater than 30  Your healthcare provider will use your height and weight to measure your BMI  The risks of obesity include  many health problems, such as injuries or physical disability  You may need tests to check for the following:  · Diabetes     · High blood pressure or high cholesterol     · Heart disease     · Gallbladder or liver disease     · Cancer of the colon, breast, prostate, liver, or kidney     · Sleep apnea     · Arthritis or gout  Seek care immediately if:   · You have a severe headache, confusion, or difficulty speaking  · You have weakness on one side of your body  · You have chest pain, sweating, or shortness of breath  Contact your healthcare provider if:   · You have symptoms of gallbladder or liver disease, such as pain in your upper abdomen  · You have knee or hip pain and discomfort while walking  · You have symptoms of diabetes, such as intense hunger and thirst, and frequent urination  · You have symptoms of sleep apnea, such as snoring or daytime sleepiness  · You have questions or concerns about your condition or care  Treatment for obesity  focuses on helping you lose weight to improve your health  Even a small decrease in BMI can reduce the risk for many health problems  Your healthcare provider will help you set a weight-loss goal   · Lifestyle changes  are the first step in treating obesity  These include making healthy food choices and getting regular physical activity  Your healthcare provider may suggest a weight-loss program that involves coaching, education, and therapy  · Medicine  may help you lose weight when it is used with a healthy diet and physical activity  · Surgery  can help you lose weight if you are very obese and have other health problems  There are several types of weight-loss surgery   Ask your healthcare provider for more information  Be successful losing weight:   · Set small, realistic goals  An example of a small goal is to walk for 20 minutes 5 days a week  Anther goal is to lose 5% of your body weight  · Tell friends, family members, and coworkers about your goals  and ask for their support  Ask a friend to lose weight with you, or join a weight-loss support group  · Identify foods or triggers that may cause you to overeat , and find ways to avoid them  Remove tempting high-calorie foods from your home and workplace  Place a bowl of fresh fruit on your kitchen counter  If stress causes you to eat, then find other ways to cope with stress  · Keep a diary to track what you eat and drink  Also write down how many minutes of physical activity you do each day  Weigh yourself once a week and record it in your diary  Eating changes: You will need to eat 500 to 1,000 fewer calories each day than you currently eat to lose 1 to 2 pounds a week  The following changes will help you cut calories:  · Eat smaller portions  Use small plates, no larger than 9 inches in diameter  Fill your plate half full of fruits and vegetables  Measure your food using measuring cups until you know what a serving size looks like  · Eat 3 meals and 1 or 2 snacks each day  Plan your meals in advance  [employer] and eat at home most of the time  Eat slowly  · Eat fruits and vegetables at every meal   They are low in calories and high in fiber, which makes you feel full  Do not add butter, margarine, or cream sauce to vegetables  Use herbs to season steamed vegetables  · Eat less fat and fewer fried foods  Eat more baked or grilled chicken and fish  These protein sources are lower in calories and fat than red meat  Limit fast food  Dress your salads with olive oil and vinegar instead of bottled dressing  · Limit the amount of sugar you eat  Do not drink sugary beverages  Limit alcohol    Activity changes: Physical activity is good for your body in many ways  It helps you burn calories and build strong muscles  It decreases stress and depression, and improves your mood  It can also help you sleep better  Talk to your healthcare provider before you begin an exercise program   · Exercise for at least 30 minutes 5 days a week  Start slowly  Set aside time each day for physical activity that you enjoy and that is convenient for you  It is best to do both weight training and an activity that increases your heart rate, such as walking, bicycling, or swimming  · Find ways to be more active  Do yard work and housecleaning  Walk up the stairs instead of using elevators  Spend your leisure time going to events that require walking, such as outdoor festivals or fairs  This extra physical activity can help you lose weight and keep it off  Follow up with your healthcare provider as directed: You may need to meet with a dietitian  Write down your questions so you remember to ask them during your visits  © 2017 [employer] [employer] Information is for End User's use only and may not be sold, redistributed or otherwise used for commercial purposes  All illustrations and images included in CareNotes® are the copyrighted property of [employer]  or Rudi Barrios  The above information is an  only  It is not intended as medical advice for individual conditions or treatments  Talk to your doctor, nurse or pharmacist before following any medical regimen to see if it is safe and effective for you  Weight Management   AMBULATORY CARE:   Why it is important to manage your weight:  Being overweight increases your risk of health conditions such as heart disease, high blood pressure, type 2 diabetes, and certain types of cancer  It can also increase your risk for osteoarthritis, sleep apnea, and other respiratory problems  Aim for a slow, steady weight loss   Even a small amount of weight loss can lower your risk of health problems  How to lose weight safely:  A safe and healthy way to lose weight is to eat fewer calories and get regular exercise  You can lose up about 1 pound a week by decreasing the number of calories you eat by 500 calories each day  You can decrease calories by eating smaller portion sizes or by cutting out high-calorie foods  Read labels to find out how many calories are in the foods you eat  You can also burn calories with exercise such as walking, swimming, or biking  You will be more likely to keep weight off if you make these changes part of your lifestyle  Healthy meal plan for weight management:  A healthy meal plan includes a variety of foods, contains fewer calories, and helps you stay healthy  A healthy meal plan includes the following:  · Eat whole-grain foods more often  A healthy meal plan should contain fiber  Fiber is the part of grains, fruits, and vegetables that is not broken down by your body  Whole-grain foods are healthy and provide extra fiber in your diet  Some examples of whole-grain foods are whole-wheat breads and pastas, oatmeal, brown rice, and bulgur  · Eat a variety of vegetables every day  Include dark, leafy greens such as spinach, kale, brandon greens, and mustard greens  Eat yellow and orange vegetables such as carrots, sweet potatoes, and winter squash  · Eat a variety of fruits every day  Choose fresh or canned fruit (canned in its own juice or light syrup) instead of juice  Fruit juice has very little or no fiber  · Eat low-fat dairy foods  Drink fat-free (skim) milk or 1% milk  Eat fat-free yogurt and low-fat cottage cheese  Try low-fat cheeses such as mozzarella and other reduced-fat cheeses  · Choose meat and other protein foods that are low in fat  Choose beans or other legumes such as split peas or lentils  Choose fish, skinless poultry (chicken or turkey), or lean cuts of red meat (beef or pork)   Before you cook meat or poultry, cut off any visible fat  · Use less fat and oil  Try baking foods instead of frying them  Add less fat, such as margarine, sour cream, regular salad dressing and mayonnaise to foods  Eat fewer high-fat foods  Some examples of high-fat foods include french fries, doughnuts, ice cream, and cakes  · Eat fewer sweets  Limit foods and drinks that are high in sugar  This includes candy, cookies, regular soda, and sweetened drinks  Ways to decrease calories:   · Eat smaller portions  ¨ Use a small plate with smaller servings  ¨ Do not eat second helpings  ¨ When you eat at a restaurant, ask for a box and place half of your meal in the box before you eat  ¨ Share an entrée with someone else  · Replace high-calorie snacks with healthy, low-calorie snacks  ¨ Choose fresh fruit, vegetables, fat-free rice cakes, or air-popped popcorn instead of potato chips, nuts, or chocolate  ¨ Choose water or calorie-free drinks instead of soda or sweetened drinks  · Eat regular meals  Skipping meals can lead to overeating later in the day  Eat a healthy snack in place of a meal if you do not have time to eat a regular meal      · Do not shop for groceries when you are hungry  You may be more likely to make unhealthy food choices  Take a grocery list of healthy foods and shop after you have eaten  Exercise:  Exercise at least 30 minutes per day on most days of the week  Some examples of exercise include walking, biking, dancing, and swimming  You can also fit in more physical activity by taking the stairs instead of the elevator or parking farther away from stores  Ask your healthcare provider about the best exercise plan for you  Other things to consider as you try to lose weight:   · Be aware of situations that may give you the urge to overeat, such as eating while watching television  Find ways to avoid these situations  For example, read a book, go for a walk, or do crafts      · Meet with a weight loss support group or friends who are also trying to lose weight  This may help you stay motivated to continue working on your weight loss goals  © 2017 [employer] Information is for End User's use only and may not be sold, redistributed or otherwise used for commercial purposes  All illustrations and images included in CareNotes® are the copyrighted property of [employer]  or Rudi Barrios  The above information is an  only  It is not intended as medical advice for individual conditions or treatments  Talk to your doctor, nurse or pharmacist before following any medical regimen to see if it is safe and effective for you

## 2019-07-26 NOTE — PROGRESS NOTES
Assessment/Plan:    DDD (degenerative disc disease), lumbosacral  Doing well on meds       Diagnoses and all orders for this visit:    DDD (degenerative disc disease), lumbosacral    Morbid obesity with BMI of 40 0-44 9, adult ([facility] [address] )    Other orders  -     gabapentin (NEURONTIN) 300 mg capsule; gabapentin 300 mg capsule  -     piroxicam (FELDENE) 10 mg capsule; piroxicam 10 mg capsule          Subjective:      Patient ID: Cindy Arteaga is a 27 y o  male  Back Pain   This is a chronic problem  The current episode started more than 1 year ago  The problem occurs intermittently  The problem has been waxing and waning since onset  The pain is present in the lumbar spine  The quality of the pain is described as aching  The pain is at a severity of 4/10  The pain is mild  The symptoms are aggravated by twisting and bending  Associated symptoms include paresthesias  Pertinent negatives include no bladder incontinence, bowel incontinence, chest pain or numbness  (Occ numbness and shakiness in legs when has flare up) Risk factors include sedentary lifestyle and obesity  He has tried muscle relaxant and analgesics for the symptoms  The treatment provided moderate relief  The following portions of the patient's history were reviewed and updated as appropriate: allergies, current medications, past family history, past medical history, past social history, past surgical history and problem list     Review of Systems   Constitutional: Negative for activity change, appetite change and unexpected weight change  Respiratory: Positive for shortness of breath  Negative for cough  Occ shortness of breath due to chemical exposure at work   Cardiovascular: Negative for chest pain  Gastrointestinal: Negative for bowel incontinence  Genitourinary: Negative for bladder incontinence  Musculoskeletal: Positive for back pain  Neurological: Positive for paresthesias  Negative for numbness           Objective:      BP 132/94   Pulse 88   Temp 98 7 °F (37 1 °C) (Temporal)   Resp 18   Ht 6' (1 829 m)   Wt (!) 145 kg (318 lb 9 6 oz)   BMI 43 21 kg/m²          Physical Exam   Constitutional: He appears well-developed and well-nourished  Neck: No thyromegaly present  Cardiovascular: Normal rate, regular rhythm and normal heart sounds  Pulmonary/Chest: Effort normal and breath sounds normal    Musculoskeletal: He exhibits tenderness  Lumbar back: He exhibits decreased range of motion, tenderness and spasm  Lymphadenopathy:     He has no cervical adenopathy  Vitals reviewed  BMI Counseling: Body mass index is 43 21 kg/m²  Discussed the patient's BMI with him  The BMI is above average  BMI counseling and education was provided to the patient  Nutrition recommendations include reducing portion sizes, decreasing overall calorie intake, 3-5 servings of fruits/vegetables daily, reducing fast food intake and consuming healthier snacks  Exercise recommendations include exercising 3-5 times per week

## 2019-07-26 NOTE — TELEPHONE ENCOUNTER
To Whom It May Concern:   Mr Stu Shelby is under my care for chronic back pain due to a work related injury   His restrictions are indefinite and are as follows: Weight limit for lifting of no greater than 35 pounds without assist, no snow shoveling, and needs breaks as needed every 2 hours for back pain

## 2019-10-22 ENCOUNTER — TELEPHONE (OUTPATIENT)
Dept: FAMILY MEDICINE CLINIC | Facility: CLINIC | Age: 34
End: 2019-10-22

## 2019-10-22 NOTE — TELEPHONE ENCOUNTER
PATIENT CALLED IN STATED NEEDS REFILL ON "HYDROCODONE-ACETAMINOPHEN 7 5-300MG" SENT TO [facility]  PT STATED UNSURE IF HE WOULD NEED AN OV  PT LAST SEEN BY DR Brice Tavera ON 7/26/19  PT STATED THIS IS THROUGH [facility]   PT WOULD LIKE A CALL BACK -[phone]

## 2019-11-05 ENCOUNTER — OFFICE VISIT (OUTPATIENT)
Dept: FAMILY MEDICINE CLINIC | Facility: CLINIC | Age: 34
End: 2019-11-05
Payer: OTHER MISCELLANEOUS

## 2019-11-05 VITALS
HEIGHT: 72 IN | WEIGHT: 315 LBS | HEART RATE: 64 BPM | BODY MASS INDEX: 42.66 KG/M2 | SYSTOLIC BLOOD PRESSURE: 130 MMHG | DIASTOLIC BLOOD PRESSURE: 90 MMHG

## 2019-11-05 DIAGNOSIS — M51.37 DEGENERATION OF INTERVERTEBRAL DISC OF LUMBOSACRAL REGION: ICD-10-CM

## 2019-11-05 DIAGNOSIS — M51.37 DEGENERATION OF INTERVERTEBRAL DISC OF LUMBOSACRAL REGION: Primary | ICD-10-CM

## 2019-11-05 DIAGNOSIS — M51.37 DDD (DEGENERATIVE DISC DISEASE), LUMBOSACRAL: ICD-10-CM

## 2019-11-05 PROCEDURE — G0480 DRUG TEST DEF 1-7 CLASSES: HCPCS | Performed by: FAMILY MEDICINE

## 2019-11-05 PROCEDURE — 99213 OFFICE O/P EST LOW 20 MIN: CPT | Performed by: FAMILY MEDICINE

## 2019-11-05 PROCEDURE — 80361 OPIATES 1 OR MORE: CPT | Performed by: FAMILY MEDICINE

## 2019-11-05 RX ORDER — HYDROCODONE BITARTRATE AND ACETAMINOPHEN 7.5; 3 MG/1; MG/1
1 TABLET ORAL EVERY 6 HOURS PRN
Qty: 60 TABLET | Refills: 0 | Status: SHIPPED | OUTPATIENT
Start: 2019-11-05 | End: 2020-04-24 | Stop reason: SDUPTHER

## 2019-11-05 RX ORDER — CYCLOBENZAPRINE HCL 5 MG
5 TABLET ORAL 3 TIMES DAILY PRN
Qty: 60 TABLET | Refills: 2 | Status: SHIPPED | OUTPATIENT
Start: 2019-11-05 | End: 2020-09-26

## 2019-11-05 RX ORDER — DULOXETIN HYDROCHLORIDE 30 MG/1
30 CAPSULE, DELAYED RELEASE ORAL DAILY
Qty: 30 CAPSULE | Refills: 5 | Status: SHIPPED | OUTPATIENT
Start: 2019-11-05 | End: 2020-04-24 | Stop reason: CLARIF

## 2019-11-05 NOTE — ASSESSMENT & PLAN NOTE
Usually takes 1 tab/day for back pain with weather changes sometimes 2/day, had  Been given neurontin but was concerned about weight gain, does need uds and pain management contract updated, will add Cymbalta

## 2019-11-05 NOTE — PROGRESS NOTES
Assessment and Plan:    Problem List Items Addressed This Visit        Musculoskeletal and Integument    DDD (degenerative disc disease), lumbosacral     Usually takes 1 tab/day for back pain with weather changes sometimes 2/day, had  Been given neurontin but was concerned about weight gain, does need uds and pain management contract updated, will add Cymbalta         Relevant Medications    cyclobenzaprine (FLEXERIL) 5 mg tablet    DULoxetine (CYMBALTA) 30 mg delayed release capsule      Other Visit Diagnoses     Degeneration of intervertebral disc of lumbosacral region        Relevant Medications    HYDROcodone-acetaminophen (VICODIN ES) 7 5-300 MG per tablet    cyclobenzaprine (FLEXERIL) 5 mg tablet    DULoxetine (CYMBALTA) 30 mg delayed release capsule                 Diagnoses and all orders for this visit:    Degeneration of intervertebral disc of lumbosacral region  -     HYDROcodone-acetaminophen (VICODIN ES) 7 5-300 MG per tablet; Take 1 tablet by mouth every 6 (six) hours as needed for moderate painMax Daily Amount: 4 tablets  -     cyclobenzaprine (FLEXERIL) 5 mg tablet; Take 1 tablet (5 mg total) by mouth 3 (three) times a day as needed for muscle spasms  -     DULoxetine (CYMBALTA) 30 mg delayed release capsule; Take 1 capsule (30 mg total) by mouth daily    DDD (degenerative disc disease), lumbosacral  -     cyclobenzaprine (FLEXERIL) 5 mg tablet; Take 1 tablet (5 mg total) by mouth 3 (three) times a day as needed for muscle spasms  -     DULoxetine (CYMBALTA) 30 mg delayed release capsule; Take 1 capsule (30 mg total) by mouth daily              Subjective:      Patient ID: Simeon Braga is a 27 y o  male  CC:    Chief Complaint   Patient presents with    Back Pain     WC follow up to back pain    - h       HPI:    Here for f/u back pain, says pain med is barely helping, has been able to keep job, no new numbness or tingling unless does lifting,takes frequent breaks      The following portions of the patient's history were reviewed and updated as appropriate: allergies, current medications, past family history, past medical history, past social history, past surgical history and problem list      Review of Systems   Constitutional: Negative for activity change, appetite change and unexpected weight change  Respiratory: Positive for chest tightness  Negative for cough and shortness of breath  Cardiovascular: Positive for chest pain  Negative for palpitations and leg swelling  Gastrointestinal: Negative for constipation  Neurological: Positive for numbness  Psychiatric/Behavioral: Positive for sleep disturbance  The patient is not nervous/anxious  Data to review:       Objective:    Vitals:    11/05/19 1820   BP: 130/90   BP Location: Left arm   Patient Position: Sitting   Cuff Size: Large   Pulse: 64   Weight: (!) 147 kg (323 lb)   Height: 6' (1 829 m)        Physical Exam   Constitutional: He appears well-developed and well-nourished  Neck: No thyromegaly present  Cardiovascular: Normal rate, regular rhythm, normal heart sounds and intact distal pulses  Pulmonary/Chest: Effort normal and breath sounds normal    Musculoskeletal: He exhibits tenderness  He exhibits no edema  Lumbar back: He exhibits decreased range of motion, tenderness and spasm  Vitals reviewed

## 2019-11-11 LAB
HYDROCODONE UR QL: 617 NG/ML
HYDROMORPHONE UR QL: 71 NG/ML

## 2019-11-22 ENCOUNTER — TELEPHONE (OUTPATIENT)
Dept: FAMILY MEDICINE CLINIC | Facility: CLINIC | Age: 34
End: 2019-11-22

## 2019-11-22 NOTE — TELEPHONE ENCOUNTER
Pt called the office c/o possible external hemorrhoids with pain going down left leg mainly to calf that started yesterday  Pt states it feels like a "charley horse"  Pt was unable to get out of work due to staffing issues, [facility] after he is out of work  Advised pt to call us after he goes to schedule urgent care follow up with PCP if needed

## 2019-11-25 ENCOUNTER — OFFICE VISIT (OUTPATIENT)
Dept: FAMILY MEDICINE CLINIC | Facility: CLINIC | Age: 34
End: 2019-11-25
Payer: COMMERCIAL

## 2019-11-25 VITALS — TEMPERATURE: 97.7 F | BODY MASS INDEX: 42.45 KG/M2 | WEIGHT: 313 LBS

## 2019-11-25 DIAGNOSIS — K61.1 PERIRECTAL ABSCESS: Primary | ICD-10-CM

## 2019-11-25 DIAGNOSIS — R73.01 ELEVATED FASTING GLUCOSE: ICD-10-CM

## 2019-11-25 DIAGNOSIS — Z13.6 SCREENING FOR HEART DISEASE: ICD-10-CM

## 2019-11-25 DIAGNOSIS — E66.01 MORBID OBESITY WITH BMI OF 40.0-44.9, ADULT (HCC): ICD-10-CM

## 2019-11-25 PROCEDURE — 99214 OFFICE O/P EST MOD 30 MIN: CPT | Performed by: PHYSICIAN ASSISTANT

## 2019-11-25 NOTE — TELEPHONE ENCOUNTER
Please advise  I'm not sure all of what is going on here  Looks like he has leg pain and a lump, hemorrhoids and a abscess somewhere   He will be seeing Tanner Amos, would you like to wait to see Lucien's note before commenting, if so pt will be seen tonight at 6:15

## 2019-11-25 NOTE — TELEPHONE ENCOUNTER
PATIENT CALLED IN STATED HE [facility] FOR RU[facility]URE [facility] & WAS TOLD F/U WITH PCP TO TEST FOR DIABETES  PT SOUNDED ANGRY STATED HE WAS TREATED FOR RUPTURED ABCESS & WAS CUT AND PATCHING CAME OUT THE NEXT DAY HE STATED IT FELL OUT ON HIS OWN WENT BACK [facility] DID NOT HAVE A GOOD EXPERIENCE PT STATED HE WAS PLACED ON ANTIBIOTIC AND THEY TOLD HIM HE WAS FINE   PT WAS SCHEDULED TODAY 11/25 WITH MAT FOR F/U BUT PT WOULD STILL LIKE TO HEAR FROM / CURT'S OPINION   PLEASE CALL PATIENT BACK -[phone]

## 2019-11-25 NOTE — PROGRESS NOTES
Assessment/Plan:  Patient Instructions    1  Perirectal abscess -this seems to be healing after spontaneous drainage and antibiotic therapy with Bactrim  Patient is encouraged to finish the remaining 7 days of his Bactrim therapy  He could also use warm soaks with a washcloth in the area 2 or 3 times daily  He should keep a close eye on symptoms if they would worsen again he is to call us as soon as possible  Patient verbalizes understanding and agreement with plan  We did discuss the possibility if this came back of seeing a colorectal specialist   Patient does have a specialist he has seen previously for hemorrhoids and diverticulosis  [facility] were not available at the visit  No purulent drainage for culture at this time  Will await their records  Patient believes they did a culture at his visit  2  Elevated fasting glucose -we discussed his weight and family history of diabetes and possible risk  He is agreeable to going for testing and we will order CBC, CMP, lipids, hemoglobin A1c and TSH  3   Obesity -continue with diet and exercise efforts discussed  Diagnoses and all orders for this visit:    Perirectal abscess  -     CBC and differential  -     Comprehensive metabolic panel  -     Lipid Panel with Direct LDL reflex  -     TSH, 3rd generation with Free T4 reflex  -     Hemoglobin A1C    Screening for heart disease  -     CBC and differential  -     Comprehensive metabolic panel  -     Lipid Panel with Direct LDL reflex  -     TSH, 3rd generation with Free T4 reflex  -     Hemoglobin A1C    Elevated fasting glucose  -     CBC and differential  -     Comprehensive metabolic panel  -     Lipid Panel with Direct LDL reflex  -     TSH, 3rd generation with Free T4 reflex  -     Hemoglobin A1C    Morbid obesity with BMI of 40 0-44 9, adult (HCC)          Subjective:      Patient ID: Joel Barragan is a 27 y o  male       HPI:  This is a 26-year-old gentleman that presents to the office for follow-up of a perirectal abscess  He was seen in the urgent care setting 3 days ago and had spontaneous drainage of the area  Prior to drainage he had pain all down his thigh on the affected side and localized cellulitis  He was treated in the urgent care with packing and started on Bactrim  Since being on the Bactrim for 3 days he has had resolution of his thigh pain and drainage has now stopped  He has having much less pain in the area and it does seem to be improving  He has not had any fevers, chills, or other signs of systemic infection  He is concerned because there is a family history of diabetes present and he last had blood work about 3 years ago which showed his fasting sugar was slightly elevated at that time  The following portions of the patient's history were reviewed and updated as appropriate: allergies, current medications, past family history, past medical history, past social history, past surgical history and problem list     Review of Systems   Constitutional: Negative for fever  HENT: Negative for congestion  Respiratory: Negative for cough, chest tightness and shortness of breath  Cardiovascular: Negative for chest pain  Musculoskeletal: Negative for arthralgias  Skin: Positive for wound  Objective:      Temp 97 7 °F (36 5 °C)   Wt (!) 142 kg (313 lb)   BMI 42 45 kg/m²          Physical Exam   Constitutional: He is oriented to person, place, and time  He appears well-developed and well-nourished  HENT:   Head: Normocephalic  Cardiovascular: Normal rate and regular rhythm  Pulmonary/Chest: Effort normal and breath sounds normal  No respiratory distress  Abdominal: Soft  Musculoskeletal: Normal range of motion  Neurological: He is alert and oriented to person, place, and time     Skin:     Left perianal area approximately 2 cm lateral and inferior (  Approximately 8:00 position) to the anal opening there is a slit incision which is approximately 1 cm in length  There is minimal induration of approximately 1/2 cm underneath this  There is no purulent drainage or blood at this time  Patient has very minimal tenderness with palpation  There is no surrounding erythema present  Psychiatric: He has a normal mood and affect  Nursing note and vitals reviewed  BMI Counseling: Body mass index is 42 45 kg/m²  The BMI is above normal  Nutrition recommendations include reducing portion sizes

## 2019-11-26 ENCOUNTER — TELEPHONE (OUTPATIENT)
Dept: FAMILY MEDICINE CLINIC | Facility: CLINIC | Age: 34
End: 2019-11-26

## 2019-11-26 DIAGNOSIS — E66.01 MORBID OBESITY WITH BMI OF 40.0-44.9, ADULT (HCC): ICD-10-CM

## 2019-11-26 DIAGNOSIS — R73.01 ELEVATED FASTING GLUCOSE: Primary | ICD-10-CM

## 2019-11-26 NOTE — TELEPHONE ENCOUNTER
I spoke to pt last night and again today  He does not need info from you about this current issue now but he wants to know if he should have BW done and follow up with you

## 2019-11-26 NOTE — TELEPHONE ENCOUNTER
Pt called in to let Alicia Amaro know his Bacteria culture was neg and was told it will be sent over

## 2019-12-05 NOTE — TELEPHONE ENCOUNTER
Patient aware he should go for labs ordered by [facility] since they are duplicated and there are 2 more

## 2019-12-15 LAB
ALBUMIN SERPL-MCNC: 4.7 G/DL (ref 3.6–5.1)
ALBUMIN/GLOB SERPL: 2 (CALC) (ref 1–2.5)
ALP SERPL-CCNC: 67 U/L (ref 40–115)
ALT SERPL-CCNC: 33 U/L (ref 9–46)
AST SERPL-CCNC: 24 U/L (ref 10–40)
BASOPHILS # BLD AUTO: 28 CELLS/UL (ref 0–200)
BASOPHILS NFR BLD AUTO: 0.5 %
BILIRUB SERPL-MCNC: 0.7 MG/DL (ref 0.2–1.2)
BUN SERPL-MCNC: 13 MG/DL (ref 7–25)
BUN/CREAT SERPL: NORMAL (CALC) (ref 6–22)
CALCIUM SERPL-MCNC: 9.6 MG/DL (ref 8.6–10.3)
CHLORIDE SERPL-SCNC: 102 MMOL/L (ref 98–110)
CHOLEST SERPL-MCNC: 167 MG/DL
CHOLEST/HDLC SERPL: 4.5 (CALC)
CO2 SERPL-SCNC: 29 MMOL/L (ref 20–32)
CREAT SERPL-MCNC: 0.82 MG/DL (ref 0.6–1.35)
EOSINOPHIL # BLD AUTO: 72 CELLS/UL (ref 15–500)
EOSINOPHIL NFR BLD AUTO: 1.3 %
ERYTHROCYTE [DISTWIDTH] IN BLOOD BY AUTOMATED COUNT: 13 % (ref 11–15)
GLOBULIN SER CALC-MCNC: 2.3 G/DL (CALC) (ref 1.9–3.7)
GLUCOSE SERPL-MCNC: 94 MG/DL (ref 65–99)
HBA1C MFR BLD: 5.3 % OF TOTAL HGB
HCT VFR BLD AUTO: 49.5 % (ref 38.5–50)
HDLC SERPL-MCNC: 37 MG/DL
HGB BLD-MCNC: 16.7 G/DL (ref 13.2–17.1)
LDLC SERPL CALC-MCNC: 106 MG/DL (CALC)
LYMPHOCYTES # BLD AUTO: 1925 CELLS/UL (ref 850–3900)
LYMPHOCYTES NFR BLD AUTO: 35 %
MCH RBC QN AUTO: 28.6 PG (ref 27–33)
MCHC RBC AUTO-ENTMCNC: 33.7 G/DL (ref 32–36)
MCV RBC AUTO: 84.9 FL (ref 80–100)
MONOCYTES # BLD AUTO: 506 CELLS/UL (ref 200–950)
MONOCYTES NFR BLD AUTO: 9.2 %
NEUTROPHILS # BLD AUTO: 2970 CELLS/UL (ref 1500–7800)
NEUTROPHILS NFR BLD AUTO: 54 %
NONHDLC SERPL-MCNC: 130 MG/DL (CALC)
PLATELET # BLD AUTO: 201 THOUSAND/UL (ref 140–400)
PMV BLD REES-ECKER: 10 FL (ref 7.5–12.5)
POTASSIUM SERPL-SCNC: 3.8 MMOL/L (ref 3.5–5.3)
PROT SERPL-MCNC: 7 G/DL (ref 6.1–8.1)
RBC # BLD AUTO: 5.83 MILLION/UL (ref 4.2–5.8)
SL AMB EGFR AFRICAN AMERICAN: 133 ML/MIN/1.73M2
SL AMB EGFR NON AFRICAN AMERICAN: 115 ML/MIN/1.73M2
SODIUM SERPL-SCNC: 140 MMOL/L (ref 135–146)
TRIGL SERPL-MCNC: 128 MG/DL
TSH SERPL-ACNC: 2.23 MIU/L (ref 0.4–4.5)
WBC # BLD AUTO: 5.5 THOUSAND/UL (ref 3.8–10.8)

## 2020-04-24 ENCOUNTER — TELEMEDICINE (OUTPATIENT)
Dept: FAMILY MEDICINE CLINIC | Facility: CLINIC | Age: 35
End: 2020-04-24
Payer: OTHER MISCELLANEOUS

## 2020-04-24 VITALS — WEIGHT: 315 LBS | TEMPERATURE: 95.8 F | HEIGHT: 72 IN | BODY MASS INDEX: 42.66 KG/M2

## 2020-04-24 DIAGNOSIS — M51.37 DEGENERATION OF INTERVERTEBRAL DISC OF LUMBOSACRAL REGION: ICD-10-CM

## 2020-04-24 DIAGNOSIS — M51.37 DDD (DEGENERATIVE DISC DISEASE), LUMBOSACRAL: Primary | ICD-10-CM

## 2020-04-24 PROCEDURE — 99213 OFFICE O/P EST LOW 20 MIN: CPT | Performed by: FAMILY MEDICINE

## 2020-04-24 RX ORDER — HYDROCODONE BITARTRATE AND ACETAMINOPHEN 7.5; 3 MG/1; MG/1
1 TABLET ORAL EVERY 6 HOURS PRN
Qty: 60 TABLET | Refills: 0 | Status: SHIPPED | OUTPATIENT
Start: 2020-04-24 | End: 2020-10-20 | Stop reason: CLARIF

## 2020-08-11 DIAGNOSIS — J98.01 BRONCHOSPASM: ICD-10-CM

## 2020-08-11 RX ORDER — ALBUTEROL SULFATE 90 UG/1
AEROSOL, METERED RESPIRATORY (INHALATION)
Qty: 6.7 INHALER | Refills: 5 | Status: SHIPPED | OUTPATIENT
Start: 2020-08-11 | End: 2021-08-16

## 2020-09-03 ENCOUNTER — OFFICE VISIT (OUTPATIENT)
Dept: FAMILY MEDICINE CLINIC | Facility: CLINIC | Age: 35
End: 2020-09-03
Payer: COMMERCIAL

## 2020-09-03 VITALS
HEIGHT: 72 IN | BODY MASS INDEX: 42.66 KG/M2 | TEMPERATURE: 97.1 F | DIASTOLIC BLOOD PRESSURE: 72 MMHG | HEART RATE: 80 BPM | WEIGHT: 315 LBS | SYSTOLIC BLOOD PRESSURE: 114 MMHG

## 2020-09-03 DIAGNOSIS — R31.9 HEMATURIA, UNSPECIFIED TYPE: Primary | ICD-10-CM

## 2020-09-03 LAB
SL AMB  POCT GLUCOSE, UA: NORMAL
SL AMB LEUKOCYTE ESTERASE,UA: NORMAL
SL AMB POCT BILIRUBIN,UA: NORMAL
SL AMB POCT BLOOD,UA: NORMAL
SL AMB POCT CLARITY,UA: CLEAR
SL AMB POCT COLOR,UA: YELLOW
SL AMB POCT KETONES,UA: NORMAL
SL AMB POCT NITRITE,UA: NORMAL
SL AMB POCT PH,UA: 6
SL AMB POCT SPECIFIC GRAVITY,UA: 1.01
SL AMB POCT URINE PROTEIN: NORMAL
SL AMB POCT UROBILINOGEN: 0.2

## 2020-09-03 PROCEDURE — 87086 URINE CULTURE/COLONY COUNT: CPT | Performed by: FAMILY MEDICINE

## 2020-09-03 PROCEDURE — 81002 URINALYSIS NONAUTO W/O SCOPE: CPT | Performed by: FAMILY MEDICINE

## 2020-09-03 PROCEDURE — 99214 OFFICE O/P EST MOD 30 MIN: CPT | Performed by: FAMILY MEDICINE

## 2020-09-03 NOTE — PATIENT INSTRUCTIONS
Problem List Items Addressed This Visit     Hematuria - Primary     Patient does have some hematuria noted on urinalysis today, is symptoms are consistent with this as well  Would like to rule out sexually transmitted infections, as well as kidney stone verses other  I would be concerned about it being perhaps some irritation of the urethra, or prostate  Recommend follow-up with urine cytology, as this was recommended in 2014 by urology  Also, check CT stone study for completeness  Will refer back to Urology for re-evaluation  Patient may need cystoscopy, but definitely needs to have an evaluation with the urologist   Will also try to send urine today for culture  Relevant Orders    POCT urine dip (Completed)    Cytology, urine    CT renal stone study abdomen pelvis wo contrast    Ambulatory referral to Urology    Chlamydia/GC amplified DNA by PCR    RPR    Herpes I/II IgG GARY w Reflex to HSV-2          COVID 19 Instructions    Jonn Mata was advised to limit contact with others to essential tasks such as getting food, medications, and medical care  Proper handwashing reviewed, and Hand sanitzer when washing is not available  If the patient develops symptoms of COVID 19, the patient should call the office as soon as possible  For 6504-6284 Flu season, it is strongly recommended that Flu Vaccinations be obtained  Please try to download Google Duo  Once you do download this on your phone, you will be prompted to add your phone number to the account  After that, he should receive a text from [employer], and use that code to verify your phone number  After that, you should be able to use Google Duo to receive and make video calls  Please download Microsoft Teams to your phone or computer  We will be transitioning to this platform for Video Visits  Instructions for downloading this are available from the office

## 2020-09-03 NOTE — ASSESSMENT & PLAN NOTE
Patient does have some hematuria noted on urinalysis today, is symptoms are consistent with this as well  Would like to rule out sexually transmitted infections, as well as kidney stone verses other  I would be concerned about it being perhaps some irritation of the urethra, or prostate  Recommend follow-up with urine cytology, as this was recommended in 2014 by urology  Also, check CT stone study for completeness  Will refer back to Urology for re-evaluation  Patient may need cystoscopy, but definitely needs to have an evaluation with the urologist   Will also try to send urine today for culture

## 2020-09-03 NOTE — PROGRESS NOTES
Assessment and Plan:    Problem List Items Addressed This Visit     Hematuria - Primary     Patient does have some hematuria noted on urinalysis today, is symptoms are consistent with this as well  Would like to rule out sexually transmitted infections, as well as kidney stone verses other  I would be concerned about it being perhaps some irritation of the urethra, or prostate  Recommend follow-up with urine cytology, as this was recommended in 2014 by urology  Also, check CT stone study for completeness  Will refer back to Urology for re-evaluation  Patient may need cystoscopy, but definitely needs to have an evaluation with the urologist   Will also try to send urine today for culture  Relevant Orders    POCT urine dip (Completed)    Cytology, urine    CT renal stone study abdomen pelvis wo contrast    Ambulatory referral to Urology    Chlamydia/GC amplified DNA by PCR    RPR    Herpes I/II IgG GARY w Reflex to HSV-2                 Diagnoses and all orders for this visit:    Hematuria, unspecified type  -     POCT urine dip  -     Cytology, urine; Future  -     CT renal stone study abdomen pelvis wo contrast; Future  -     Ambulatory referral to Urology; Future  -     Chlamydia/GC amplified DNA by PCR; Future  -     RPR; Future  -     Herpes I/II IgG GARY w Reflex to HSV-2; Future              Subjective:      Patient ID: Emily Brizuela is a 38 y o  male  CC:    Chief Complaint   Patient presents with    Blood in Urine     Onset Tuesday  He has history of this  7 years on and off  He saw a Urologist in the past  mjs       HPI:    Patient has had some increasing problems recently with urination  Noticing blood at the end of urinating  Has been going on for approximately 7 years now  Comes and goes  Most recently, it started on Tuesday  He did not see any today, Thursday  Patient has had pain with this previously, but there was no pain with this current episode    If he does have pain, it was noted to be in the tip of the penis  Patient did note on Tuesday that there was some changes in the coloration of the urine, it looked a bit more cloudy to him  When he has an increased amount of pressure, i e  Increased urgency to urinate, he notes increasing pain, and increased blood at that point as well  Last time this was an issue was about 2 years ago  Denies discharge from the penis  Patient has had some hematospermia at times as well in the past       The following portions of the patient's history were reviewed and updated as appropriate: allergies, current medications, past family history, past medical history, past social history, past surgical history and problem list       Review of Systems   Constitutional: Negative  HENT: Negative  Eyes: Negative  Respiratory: Negative  Cardiovascular: Negative  Gastrointestinal: Negative  Endocrine: Negative  Genitourinary:        Per HPI   Musculoskeletal: Negative  Skin: Negative  Allergic/Immunologic: Negative  Neurological: Negative  Hematological: Negative  Psychiatric/Behavioral: Negative  Data to review:       Objective:    Vitals:    09/03/20 1629   BP: 114/72   Pulse: 80   Temp: (!) 97 1 °F (36 2 °C)   Weight: (!) 150 kg (331 lb)   Height: 6' (1 829 m)        Physical Exam  Vitals signs and nursing note reviewed  Constitutional:       Appearance: Normal appearance  Neck:      Vascular: No carotid bruit  Cardiovascular:      Rate and Rhythm: Normal rate and regular rhythm  Pulses: Normal pulses  Carotid pulses are 2+ on the right side and 2+ on the left side  Heart sounds: Normal heart sounds  No murmur  No gallop  Pulmonary:      Effort: Pulmonary effort is normal  No respiratory distress  Breath sounds: Normal breath sounds  No stridor  No wheezing, rhonchi or rales  Chest:      Chest wall: No tenderness     Genitourinary:     Comments: Deferred exam to Urology  Neurological:      Mental Status: He is alert

## 2020-09-04 LAB — BACTERIA UR CULT: NORMAL

## 2020-09-08 LAB
C TRACH RRNA SPEC QL NAA+PROBE: NOT DETECTED
HSV1 IGG SER IA-ACNC: <0.9 INDEX
HSV2 IGG SER IA-ACNC: <0.9 INDEX
N GONORRHOEA RRNA SPEC QL NAA+PROBE: NOT DETECTED
RPR SER QL: NORMAL

## 2020-09-09 ENCOUNTER — HOSPITAL ENCOUNTER (OUTPATIENT)
Dept: CT IMAGING | Facility: HOSPITAL | Age: 35
Discharge: HOME | End: 2020-09-09
Payer: COMMERCIAL

## 2020-09-09 DIAGNOSIS — R31.9 HEMATURIA, UNSPECIFIED TYPE: ICD-10-CM

## 2020-09-09 LAB
CLINICAL INFO: NORMAL
PATH REPORT.FINAL DX SPEC: NORMAL
SPECIMEN SOURCE: NORMAL

## 2020-09-09 PROCEDURE — G1004 CDSM NDSC: HCPCS

## 2020-09-09 PROCEDURE — 74176 CT ABD & PELVIS W/O CONTRAST: CPT

## 2020-09-17 ENCOUNTER — TELEPHONE (OUTPATIENT)
Dept: FAMILY MEDICINE CLINIC | Facility: CLINIC | Age: 35
End: 2020-09-17

## 2020-09-17 NOTE — TELEPHONE ENCOUNTER
Pt called in and stated he has a form that Dr Samreen Contreras fills out every year for his workers comp and he stated sometimes she would fill it out or sometimes she would ask him to come in for a ov? Pt would like to know if this can be sent over or if he would need an ov? Please give ov a call back with Dr Samreen Contreras recommendations  Thank you!

## 2020-09-25 DIAGNOSIS — M51.37 DEGENERATION OF INTERVERTEBRAL DISC OF LUMBOSACRAL REGION: ICD-10-CM

## 2020-09-25 DIAGNOSIS — M51.37 DDD (DEGENERATIVE DISC DISEASE), LUMBOSACRAL: ICD-10-CM

## 2020-09-26 ENCOUNTER — TELEPHONE (OUTPATIENT)
Dept: FAMILY MEDICINE CLINIC | Facility: CLINIC | Age: 35
End: 2020-09-26

## 2020-09-26 RX ORDER — CYCLOBENZAPRINE HCL 5 MG
TABLET ORAL
Qty: 60 TABLET | Refills: 2 | Status: SHIPPED | OUTPATIENT
Start: 2020-09-26 | End: 2021-03-09

## 2020-10-01 ENCOUNTER — CONSULT (OUTPATIENT)
Dept: UROLOGY | Facility: MEDICAL CENTER | Age: 35
End: 2020-10-01
Payer: COMMERCIAL

## 2020-10-01 VITALS
SYSTOLIC BLOOD PRESSURE: 118 MMHG | DIASTOLIC BLOOD PRESSURE: 74 MMHG | WEIGHT: 315 LBS | BODY MASS INDEX: 42.66 KG/M2 | TEMPERATURE: 98.1 F | HEIGHT: 72 IN

## 2020-10-01 DIAGNOSIS — R31.9 HEMATURIA, UNSPECIFIED TYPE: Primary | ICD-10-CM

## 2020-10-01 LAB
SL AMB  POCT GLUCOSE, UA: NORMAL
SL AMB LEUKOCYTE ESTERASE,UA: NORMAL
SL AMB POCT BILIRUBIN,UA: NORMAL
SL AMB POCT BLOOD,UA: NORMAL
SL AMB POCT CLARITY,UA: CLEAR
SL AMB POCT COLOR,UA: YELLOW
SL AMB POCT KETONES,UA: NORMAL
SL AMB POCT NITRITE,UA: NORMAL
SL AMB POCT PH,UA: 6
SL AMB POCT SPECIFIC GRAVITY,UA: <=1.005
SL AMB POCT URINE PROTEIN: NORMAL
SL AMB POCT UROBILINOGEN: 0.2

## 2020-10-01 PROCEDURE — 81003 URINALYSIS AUTO W/O SCOPE: CPT | Performed by: UROLOGY

## 2020-10-01 PROCEDURE — 1036F TOBACCO NON-USER: CPT | Performed by: UROLOGY

## 2020-10-01 PROCEDURE — 99203 OFFICE O/P NEW LOW 30 MIN: CPT | Performed by: UROLOGY

## 2020-10-20 ENCOUNTER — TELEMEDICINE (OUTPATIENT)
Dept: FAMILY MEDICINE CLINIC | Facility: CLINIC | Age: 35
End: 2020-10-20
Payer: OTHER MISCELLANEOUS

## 2020-10-20 VITALS — BODY MASS INDEX: 42.66 KG/M2 | HEIGHT: 72 IN | WEIGHT: 315 LBS | TEMPERATURE: 97.6 F

## 2020-10-20 DIAGNOSIS — M51.37 DDD (DEGENERATIVE DISC DISEASE), LUMBOSACRAL: Primary | ICD-10-CM

## 2020-10-20 PROCEDURE — 99213 OFFICE O/P EST LOW 20 MIN: CPT | Performed by: FAMILY MEDICINE

## 2020-10-20 RX ORDER — MELOXICAM 7.5 MG/1
7.5 TABLET ORAL DAILY
Qty: 30 TABLET | Refills: 5 | Status: SHIPPED | OUTPATIENT
Start: 2020-10-20 | End: 2020-12-28 | Stop reason: CLARIF

## 2020-11-17 ENCOUNTER — PROCEDURE VISIT (OUTPATIENT)
Dept: UROLOGY | Facility: MEDICAL CENTER | Age: 35
End: 2020-11-17
Payer: COMMERCIAL

## 2020-11-17 VITALS — TEMPERATURE: 96.7 F | BODY MASS INDEX: 42.66 KG/M2 | HEART RATE: 91 BPM | HEIGHT: 72 IN | WEIGHT: 315 LBS

## 2020-11-17 DIAGNOSIS — R31.9 HEMATURIA, UNSPECIFIED TYPE: Primary | ICD-10-CM

## 2020-11-17 LAB
SL AMB  POCT GLUCOSE, UA: ABNORMAL
SL AMB LEUKOCYTE ESTERASE,UA: ABNORMAL
SL AMB POCT BILIRUBIN,UA: ABNORMAL
SL AMB POCT BLOOD,UA: ABNORMAL
SL AMB POCT CLARITY,UA: CLEAR
SL AMB POCT COLOR,UA: YELLOW
SL AMB POCT KETONES,UA: ABNORMAL
SL AMB POCT NITRITE,UA: ABNORMAL
SL AMB POCT PH,UA: 6
SL AMB POCT SPECIFIC GRAVITY,UA: 1.02
SL AMB POCT URINE PROTEIN: ABNORMAL
SL AMB POCT UROBILINOGEN: 0.2

## 2020-11-17 PROCEDURE — 3008F BODY MASS INDEX DOCD: CPT | Performed by: UROLOGY

## 2020-11-17 PROCEDURE — 52000 CYSTOURETHROSCOPY: CPT | Performed by: UROLOGY

## 2020-11-17 PROCEDURE — 81003 URINALYSIS AUTO W/O SCOPE: CPT | Performed by: UROLOGY

## 2020-12-18 ENCOUNTER — TELEMEDICINE (OUTPATIENT)
Dept: FAMILY MEDICINE CLINIC | Facility: CLINIC | Age: 35
End: 2020-12-18
Payer: COMMERCIAL

## 2020-12-18 DIAGNOSIS — R05.9 COUGH: ICD-10-CM

## 2020-12-18 DIAGNOSIS — R05.9 COUGH: Primary | ICD-10-CM

## 2020-12-18 PROCEDURE — 99214 OFFICE O/P EST MOD 30 MIN: CPT | Performed by: PHYSICIAN ASSISTANT

## 2020-12-18 PROCEDURE — U0003 INFECTIOUS AGENT DETECTION BY NUCLEIC ACID (DNA OR RNA); SEVERE ACUTE RESPIRATORY SYNDROME CORONAVIRUS 2 (SARS-COV-2) (CORONAVIRUS DISEASE [COVID-19]), AMPLIFIED PROBE TECHNIQUE, MAKING USE OF HIGH THROUGHPUT TECHNOLOGIES AS DESCRIBED BY CMS-2020-01-R: HCPCS | Performed by: PHYSICIAN ASSISTANT

## 2020-12-19 LAB — SARS-COV-2 RNA SPEC QL NAA+PROBE: DETECTED

## 2020-12-21 ENCOUNTER — TELEMEDICINE (OUTPATIENT)
Dept: FAMILY MEDICINE CLINIC | Facility: CLINIC | Age: 35
End: 2020-12-21
Payer: COMMERCIAL

## 2020-12-21 DIAGNOSIS — E66.01 MORBID OBESITY WITH BMI OF 40.0-44.9, ADULT (HCC): ICD-10-CM

## 2020-12-21 DIAGNOSIS — R05.8 COUGH PRODUCTIVE OF PURULENT SPUTUM: ICD-10-CM

## 2020-12-21 DIAGNOSIS — U07.1 COVID-19 VIRUS INFECTION: Primary | ICD-10-CM

## 2020-12-21 PROCEDURE — 99212 OFFICE O/P EST SF 10 MIN: CPT | Performed by: FAMILY MEDICINE

## 2020-12-21 RX ORDER — CEFUROXIME AXETIL 500 MG/1
500 TABLET ORAL EVERY 12 HOURS SCHEDULED
Qty: 20 TABLET | Refills: 0 | Status: SHIPPED | OUTPATIENT
Start: 2020-12-21 | End: 2020-12-31

## 2020-12-22 ENCOUNTER — TELEMEDICINE (OUTPATIENT)
Dept: FAMILY MEDICINE CLINIC | Facility: CLINIC | Age: 35
End: 2020-12-22
Payer: COMMERCIAL

## 2020-12-22 DIAGNOSIS — U07.1 COVID-19 VIRUS INFECTION: Primary | ICD-10-CM

## 2020-12-22 PROCEDURE — 99213 OFFICE O/P EST LOW 20 MIN: CPT | Performed by: FAMILY MEDICINE

## 2020-12-22 RX ORDER — ACETAMINOPHEN 325 MG/1
650 TABLET ORAL ONCE AS NEEDED
Status: CANCELLED | OUTPATIENT
Start: 2020-12-26

## 2020-12-22 RX ORDER — SODIUM CHLORIDE 9 MG/ML
20 INJECTION, SOLUTION INTRAVENOUS ONCE
Status: CANCELLED | OUTPATIENT
Start: 2020-12-26

## 2020-12-22 RX ORDER — ALBUTEROL SULFATE 90 UG/1
3 AEROSOL, METERED RESPIRATORY (INHALATION) ONCE AS NEEDED
Status: CANCELLED | OUTPATIENT
Start: 2020-12-26

## 2020-12-24 ENCOUNTER — TELEMEDICINE (OUTPATIENT)
Dept: FAMILY MEDICINE CLINIC | Facility: CLINIC | Age: 35
End: 2020-12-24
Payer: COMMERCIAL

## 2020-12-24 VITALS — TEMPERATURE: 98 F

## 2020-12-24 DIAGNOSIS — U07.1 COVID-19 VIRUS INFECTION: Primary | ICD-10-CM

## 2020-12-24 PROCEDURE — 99213 OFFICE O/P EST LOW 20 MIN: CPT | Performed by: FAMILY MEDICINE

## 2020-12-26 ENCOUNTER — HOSPITAL ENCOUNTER (OUTPATIENT)
Dept: INFUSION CENTER | Facility: HOSPITAL | Age: 35
Discharge: HOME | End: 2020-12-26
Payer: COMMERCIAL

## 2020-12-26 VITALS
SYSTOLIC BLOOD PRESSURE: 131 MMHG | HEART RATE: 73 BPM | RESPIRATION RATE: 20 BRPM | TEMPERATURE: 96.7 F | OXYGEN SATURATION: 96 % | DIASTOLIC BLOOD PRESSURE: 77 MMHG

## 2020-12-26 DIAGNOSIS — U07.1 COVID-19 VIRUS INFECTION: Primary | ICD-10-CM

## 2020-12-26 PROCEDURE — M0239 BAMLANIVIMAB-XXXX INFUSION: HCPCS | Performed by: FAMILY MEDICINE

## 2020-12-26 RX ORDER — SODIUM CHLORIDE 9 MG/ML
20 INJECTION, SOLUTION INTRAVENOUS ONCE
Status: CANCELLED | OUTPATIENT
Start: 2020-12-26

## 2020-12-26 RX ORDER — ALBUTEROL SULFATE 90 UG/1
3 AEROSOL, METERED RESPIRATORY (INHALATION) ONCE AS NEEDED
Status: CANCELLED | OUTPATIENT
Start: 2020-12-26

## 2020-12-26 RX ORDER — ACETAMINOPHEN 325 MG/1
650 TABLET ORAL ONCE AS NEEDED
Status: DISCONTINUED | OUTPATIENT
Start: 2020-12-26 | End: 2020-12-29 | Stop reason: HOSPADM

## 2020-12-26 RX ORDER — ALBUTEROL SULFATE 90 UG/1
3 AEROSOL, METERED RESPIRATORY (INHALATION) ONCE AS NEEDED
Status: DISCONTINUED | OUTPATIENT
Start: 2020-12-26 | End: 2020-12-29 | Stop reason: HOSPADM

## 2020-12-26 RX ORDER — ACETAMINOPHEN 325 MG/1
650 TABLET ORAL ONCE AS NEEDED
Status: CANCELLED | OUTPATIENT
Start: 2020-12-26

## 2020-12-26 RX ORDER — SODIUM CHLORIDE 9 MG/ML
20 INJECTION, SOLUTION INTRAVENOUS ONCE
Status: COMPLETED | OUTPATIENT
Start: 2020-12-26 | End: 2020-12-26

## 2020-12-26 RX ADMIN — SODIUM CHLORIDE 700 MG: 9 INJECTION, SOLUTION INTRAVENOUS at 08:42

## 2020-12-26 RX ADMIN — SODIUM CHLORIDE 20 ML/HR: 0.9 INJECTION, SOLUTION INTRAVENOUS at 08:27

## 2020-12-27 ENCOUNTER — TELEMEDICINE (OUTPATIENT)
Dept: FAMILY MEDICINE CLINIC | Facility: CLINIC | Age: 35
End: 2020-12-27
Payer: COMMERCIAL

## 2020-12-27 VITALS — HEART RATE: 98 BPM

## 2020-12-27 DIAGNOSIS — U07.1 COVID-19 VIRUS INFECTION: Primary | ICD-10-CM

## 2020-12-27 PROCEDURE — 99213 OFFICE O/P EST LOW 20 MIN: CPT | Performed by: FAMILY MEDICINE

## 2020-12-27 RX ORDER — MONTELUKAST SODIUM 10 MG/1
10 TABLET ORAL
Qty: 10 TABLET | Refills: 0 | Status: SHIPPED | OUTPATIENT
Start: 2020-12-27 | End: 2021-04-20 | Stop reason: CLARIF

## 2020-12-28 ENCOUNTER — TELEMEDICINE (OUTPATIENT)
Dept: FAMILY MEDICINE CLINIC | Facility: CLINIC | Age: 35
End: 2020-12-28
Payer: COMMERCIAL

## 2020-12-28 DIAGNOSIS — U07.1 COVID-19 VIRUS INFECTION: Primary | ICD-10-CM

## 2020-12-28 PROCEDURE — 1036F TOBACCO NON-USER: CPT | Performed by: FAMILY MEDICINE

## 2020-12-28 PROCEDURE — 99213 OFFICE O/P EST LOW 20 MIN: CPT | Performed by: FAMILY MEDICINE

## 2020-12-31 ENCOUNTER — TELEMEDICINE (OUTPATIENT)
Dept: FAMILY MEDICINE CLINIC | Facility: CLINIC | Age: 35
End: 2020-12-31
Payer: COMMERCIAL

## 2020-12-31 DIAGNOSIS — B37.0 ORAL THRUSH: ICD-10-CM

## 2020-12-31 DIAGNOSIS — U07.1 COVID-19 VIRUS INFECTION: Primary | ICD-10-CM

## 2020-12-31 PROCEDURE — 99213 OFFICE O/P EST LOW 20 MIN: CPT | Performed by: FAMILY MEDICINE

## 2020-12-31 RX ORDER — CLOTRIMAZOLE 10 MG/1
10 LOZENGE ORAL; TOPICAL 3 TIMES DAILY
Qty: 21 TABLET | Refills: 0 | Status: SHIPPED | OUTPATIENT
Start: 2020-12-31 | End: 2021-01-07

## 2021-03-03 ENCOUNTER — TELEPHONE (OUTPATIENT)
Dept: FAMILY MEDICINE CLINIC | Facility: CLINIC | Age: 36
End: 2021-03-03

## 2021-03-03 NOTE — TELEPHONE ENCOUNTER
Patient has question in regards to which Covid vaccine you recommend for him to get  He works for [employer] and is being offered J&J but would like your opinion on it  I did advice him you recommend all patients to get vaccinated regardless of brand    Please advise

## 2021-03-08 DIAGNOSIS — M51.37 DDD (DEGENERATIVE DISC DISEASE), LUMBOSACRAL: ICD-10-CM

## 2021-03-08 DIAGNOSIS — M51.37 DEGENERATION OF INTERVERTEBRAL DISC OF LUMBOSACRAL REGION: ICD-10-CM

## 2021-03-09 RX ORDER — CYCLOBENZAPRINE HCL 5 MG
TABLET ORAL
Qty: 60 TABLET | Refills: 2 | Status: SHIPPED | OUTPATIENT
Start: 2021-03-09 | End: 2021-10-18

## 2021-03-24 NOTE — TELEPHONE ENCOUNTER
Pt called in and stated he is going to get the Hawa Marshall vaccine some time this week or next weekl but he would like to know when he should count his 90 days? Should he go by when he got his covid-19 test results or the date of his infusion? Please give pt a call back thank you!

## 2021-03-31 ENCOUNTER — IMMUNIZATIONS (OUTPATIENT)
Dept: FAMILY MEDICINE CLINIC | Facility: HOSPITAL | Age: 36
End: 2021-03-31

## 2021-03-31 DIAGNOSIS — Z23 ENCOUNTER FOR IMMUNIZATION: Primary | ICD-10-CM

## 2021-03-31 PROCEDURE — 0011A SARS-COV-2 / COVID-19 MRNA VACCINE (MODERNA) 100 MCG: CPT

## 2021-03-31 PROCEDURE — 91301 SARS-COV-2 / COVID-19 MRNA VACCINE (MODERNA) 100 MCG: CPT

## 2021-04-20 ENCOUNTER — OFFICE VISIT (OUTPATIENT)
Dept: FAMILY MEDICINE CLINIC | Facility: CLINIC | Age: 36
End: 2021-04-20
Payer: OTHER MISCELLANEOUS

## 2021-04-20 VITALS
WEIGHT: 315 LBS | BODY MASS INDEX: 44.1 KG/M2 | HEART RATE: 68 BPM | TEMPERATURE: 98.2 F | HEIGHT: 71 IN | SYSTOLIC BLOOD PRESSURE: 146 MMHG | DIASTOLIC BLOOD PRESSURE: 74 MMHG

## 2021-04-20 DIAGNOSIS — K21.9 GASTROESOPHAGEAL REFLUX DISEASE WITHOUT ESOPHAGITIS: Primary | ICD-10-CM

## 2021-04-20 DIAGNOSIS — M51.37 DDD (DEGENERATIVE DISC DISEASE), LUMBOSACRAL: ICD-10-CM

## 2021-04-20 PROBLEM — R31.9 HEMATURIA: Status: RESOLVED | Noted: 2020-09-03 | Resolved: 2021-04-20

## 2021-04-20 PROCEDURE — 3008F BODY MASS INDEX DOCD: CPT | Performed by: FAMILY MEDICINE

## 2021-04-20 PROCEDURE — 1036F TOBACCO NON-USER: CPT | Performed by: FAMILY MEDICINE

## 2021-04-20 PROCEDURE — 99213 OFFICE O/P EST LOW 20 MIN: CPT | Performed by: FAMILY MEDICINE

## 2021-04-20 PROCEDURE — 3725F SCREEN DEPRESSION PERFORMED: CPT | Performed by: FAMILY MEDICINE

## 2021-04-20 RX ORDER — MELOXICAM 15 MG/1
15 TABLET ORAL DAILY
Qty: 30 TABLET | Refills: 5 | Status: SHIPPED | OUTPATIENT
Start: 2021-04-20 | End: 2022-04-19 | Stop reason: SDUPTHER

## 2021-04-20 NOTE — PROGRESS NOTES
Assessment and Plan:    Problem List Items Addressed This Visit     None                 Diagnoses and all orders for this visit:    Gastroesophageal reflux disease without esophagitis    DDD (degenerative disc disease), lumbosacral              Subjective:      Patient ID: Soila Rowan is a 39 y o  male  CC:    Chief Complaint   Patient presents with    Follow-up     patient is here for a 6 month f/u re: chronic medical conditions and back pain  patient has questions re: Mobic  ak       HPI:    F/u GERd, DJD , got Moderna shot and had adverse reaction and supposed to get second shot 5/3-did take off 5/4, may need additional time off       The following portions of the patient's history were reviewed and updated as appropriate: allergies, current medications, past family history, past medical history, past social history, past surgical history and problem list       Review of Systems   Constitutional: Negative for activity change, appetite change and fatigue  Respiratory: Negative for shortness of breath  Cardiovascular: Negative for chest pain  Gastrointestinal:        Gerd   Musculoskeletal: Positive for back pain  Neurological: Positive for numbness  Negative for dizziness, weakness and headaches  Data to review:       Objective:    Vitals:    04/20/21 1530   BP: 146/74   Pulse: 68   Temp: 98 2 °F (36 8 °C)   Weight: (!) 147 kg (324 lb)   Height: 5' 11" (1 803 m)        Physical Exam  Vitals signs reviewed  Constitutional:       Appearance: Normal appearance  He is obese  Cardiovascular:      Rate and Rhythm: Normal rate and regular rhythm  Pulses: Normal pulses  Heart sounds: Normal heart sounds  Pulmonary:      Effort: Pulmonary effort is normal       Breath sounds: Normal breath sounds  Musculoskeletal:         General: Tenderness present  Comments: Significant spasm lumbar spine   Neurological:      Mental Status: He is alert

## 2021-05-03 ENCOUNTER — IMMUNIZATIONS (OUTPATIENT)
Dept: FAMILY MEDICINE CLINIC | Facility: HOSPITAL | Age: 36
End: 2021-05-03

## 2021-05-03 DIAGNOSIS — Z23 ENCOUNTER FOR IMMUNIZATION: Primary | ICD-10-CM

## 2021-05-03 PROCEDURE — 0012A SARS-COV-2 / COVID-19 MRNA VACCINE (MODERNA) 100 MCG: CPT

## 2021-05-03 PROCEDURE — 91301 SARS-COV-2 / COVID-19 MRNA VACCINE (MODERNA) 100 MCG: CPT

## 2021-08-15 DIAGNOSIS — J98.01 BRONCHOSPASM: ICD-10-CM

## 2021-08-16 RX ORDER — ALBUTEROL SULFATE 90 UG/1
AEROSOL, METERED RESPIRATORY (INHALATION)
Qty: 17 G | Refills: 5 | Status: SHIPPED | OUTPATIENT
Start: 2021-08-16

## 2021-10-18 DIAGNOSIS — M51.37 DEGENERATION OF INTERVERTEBRAL DISC OF LUMBOSACRAL REGION: ICD-10-CM

## 2021-10-18 DIAGNOSIS — M51.37 DDD (DEGENERATIVE DISC DISEASE), LUMBOSACRAL: ICD-10-CM

## 2021-10-18 RX ORDER — CYCLOBENZAPRINE HCL 5 MG
TABLET ORAL
Qty: 60 TABLET | Refills: 2 | Status: SHIPPED | OUTPATIENT
Start: 2021-10-18 | End: 2022-04-19 | Stop reason: SDUPTHER

## 2021-10-19 ENCOUNTER — OFFICE VISIT (OUTPATIENT)
Dept: FAMILY MEDICINE CLINIC | Facility: CLINIC | Age: 36
End: 2021-10-19
Payer: OTHER MISCELLANEOUS

## 2021-10-19 ENCOUNTER — CLINICAL SUPPORT (OUTPATIENT)
Dept: FAMILY MEDICINE CLINIC | Facility: CLINIC | Age: 36
End: 2021-10-19
Payer: COMMERCIAL

## 2021-10-19 VITALS
HEIGHT: 71 IN | HEART RATE: 84 BPM | TEMPERATURE: 98.3 F | SYSTOLIC BLOOD PRESSURE: 132 MMHG | DIASTOLIC BLOOD PRESSURE: 82 MMHG | WEIGHT: 315 LBS | BODY MASS INDEX: 44.1 KG/M2

## 2021-10-19 DIAGNOSIS — M51.37 DDD (DEGENERATIVE DISC DISEASE), LUMBOSACRAL: Primary | ICD-10-CM

## 2021-10-19 DIAGNOSIS — Z23 ENCOUNTER FOR IMMUNIZATION: Primary | ICD-10-CM

## 2021-10-19 PROBLEM — U07.1 COVID-19 VIRUS INFECTION: Status: RESOLVED | Noted: 2020-12-21 | Resolved: 2021-10-19

## 2021-10-19 PROCEDURE — 90471 IMMUNIZATION ADMIN: CPT

## 2021-10-19 PROCEDURE — 99213 OFFICE O/P EST LOW 20 MIN: CPT | Performed by: FAMILY MEDICINE

## 2021-10-19 PROCEDURE — 90686 IIV4 VACC NO PRSV 0.5 ML IM: CPT

## 2021-11-19 ENCOUNTER — IMMUNIZATIONS (OUTPATIENT)
Dept: FAMILY MEDICINE CLINIC | Facility: HOSPITAL | Age: 36
End: 2021-11-19

## 2021-11-19 DIAGNOSIS — Z23 ENCOUNTER FOR IMMUNIZATION: Primary | ICD-10-CM

## 2021-11-19 PROCEDURE — 0064A COVID-19 MODERNA VACC 0.25 ML BOOSTER: CPT

## 2021-11-19 PROCEDURE — 91306 COVID-19 MODERNA VACC 0.25 ML BOOSTER: CPT

## 2022-04-19 ENCOUNTER — OFFICE VISIT (OUTPATIENT)
Dept: FAMILY MEDICINE CLINIC | Facility: CLINIC | Age: 37
End: 2022-04-19
Payer: OTHER MISCELLANEOUS

## 2022-04-19 VITALS
TEMPERATURE: 97.3 F | HEART RATE: 94 BPM | BODY MASS INDEX: 44.1 KG/M2 | DIASTOLIC BLOOD PRESSURE: 86 MMHG | SYSTOLIC BLOOD PRESSURE: 134 MMHG | HEIGHT: 71 IN | WEIGHT: 315 LBS

## 2022-04-19 DIAGNOSIS — M51.37 DDD (DEGENERATIVE DISC DISEASE), LUMBOSACRAL: Primary | ICD-10-CM

## 2022-04-19 DIAGNOSIS — M51.37 DEGENERATION OF INTERVERTEBRAL DISC OF LUMBOSACRAL REGION: ICD-10-CM

## 2022-04-19 PROCEDURE — 99213 OFFICE O/P EST LOW 20 MIN: CPT | Performed by: FAMILY MEDICINE

## 2022-04-19 RX ORDER — CYCLOBENZAPRINE HCL 5 MG
5 TABLET ORAL 3 TIMES DAILY PRN
Qty: 60 TABLET | Refills: 5 | Status: SHIPPED | OUTPATIENT
Start: 2022-04-19

## 2022-04-19 RX ORDER — MELOXICAM 15 MG/1
15 TABLET ORAL DAILY
Qty: 30 TABLET | Refills: 5 | Status: SHIPPED | OUTPATIENT
Start: 2022-04-19

## 2022-09-08 ENCOUNTER — TELEMEDICINE (OUTPATIENT)
Dept: FAMILY MEDICINE CLINIC | Facility: CLINIC | Age: 37
End: 2022-09-08
Payer: COMMERCIAL

## 2022-09-08 VITALS — TEMPERATURE: 96.5 F | HEIGHT: 71 IN | WEIGHT: 315 LBS | BODY MASS INDEX: 44.1 KG/M2

## 2022-09-08 DIAGNOSIS — U07.1 COVID-19 VIRUS INFECTION: Primary | ICD-10-CM

## 2022-09-08 LAB — SARS-COV-2 AG UPPER RESP QL IA: ABNORMAL

## 2022-09-08 PROCEDURE — 99213 OFFICE O/P EST LOW 20 MIN: CPT | Performed by: FAMILY MEDICINE

## 2022-09-08 RX ORDER — COVID-19 ANTIGEN TEST
KIT MISCELLANEOUS
COMMUNITY
Start: 2022-08-09

## 2022-09-08 NOTE — ASSESSMENT & PLAN NOTE
Patient did have a positive COVID test     Symptoms were initially on the 3rd, therefore he is outside the window for treatment with medications  At this time, there is no specific changes I would recommend other than symptomatic treatment with OTC treatment  Seems to be doing extremely well at this point  Would recommend return at day 5 post symptoms, but should use a mask for 5 days after that  His employ a requires 5 days post COVID positive test, therefore he would wait that time, and again using mask for 5 days after that, which should be a KN95 or and 95

## 2022-09-08 NOTE — PATIENT INSTRUCTIONS
Problem List Items Addressed This Visit       COVID-19 virus infection - Primary     Patient did have a positive COVID test     Symptoms were initially on the 3rd, therefore he is outside the window for treatment with medications  At this time, there is no specific changes I would recommend other than symptomatic treatment with OTC treatment  Seems to be doing extremely well at this point  Would recommend return at day 5 post symptoms, but should use a mask for 5 days after that  His employ a requires 5 days post COVID positive test, therefore he would wait that time, and again using mask for 5 days after that, which should be a KN95 or and 95  COVID 19 Instructions    Moorewilfrido Trotter was advised to limit contact with others to essential tasks such as getting food, medications, and medical care  Proper handwashing reviewed, and Hand sanitzer when washing is not available  If the patient develops symptoms of COVID 19, the patient should call the office as soon as possible  For 7470-8080 Flu season, it is strongly recommended that Flu Vaccinations be obtained  Virtual Visits:  Sasha: This works on smart phones (any phone with Internet browsing capability)  You should get a text message when the provider is ready to see you  Click on the link in the text message, and the call should start  You will need to type in your name, and allow camera and microphone access  This is HIPPA compliant, and secure  If you have not already done so, get immunized to COVID 19  We are committed to getting you vaccinated as soon as possible and will be closely following CDC and [facility]  guidelines as they are released and revised  Please refer to our COVID-19 vaccine webpage for the most up to date information on the vaccine and our distribution efforts      This site will also have the most up to date recommendations for COVID booster tricia Castro tn    Call [phone] ([phone]), option 7    OUR NEW LOCATION:    [address], [facility] [address], [place], [address]  Fax: [phone]    Lab services and OB/GYN are at this location as well

## 2022-09-08 NOTE — PROGRESS NOTES
COVID-19 Outpatient Progress Note    Assessment/Plan:    Problem List Items Addressed This Visit     COVID-19 virus infection - Primary     Patient did have a positive COVID test     Symptoms were initially on the 3rd, therefore he is outside the window for treatment with medications  At this time, there is no specific changes I would recommend other than symptomatic treatment with OTC treatment  Seems to be doing extremely well at this point  Would recommend return at day 5 post symptoms, but should use a mask for 5 days after that  His employ a requires 5 days post COVID positive test, therefore he would wait that time, and again using mask for 5 days after that, which should be a KN95 or and 95  Disposition:     Patient has asymptomatic or mild COVID-19 infection  Based off CDC guidelines, they were recommended to isolate for 5 days  If they are asymptomatic or symptoms are improving with no fevers in the past 24 hours, isolation may be ended followed by 5 days of wearing a mask when around othes to minimize risk of infecting others  If still have a fever or other symptoms have not improved, continue to isolate until they improve  Regardless of when they end isolation, avoid being around people who are more likely to get very sick from COVID-19 until at least day 11  Discussed symptom directed medication options with patient  Discussed vitamin D, vitamin C, and/or zinc supplementation with patient  I have spent 20 minutes directly with the patient  Encounter provider: Phil Junior MD     Provider located at: [address] [address] [phone]     Recent Visits  No visits were found meeting these conditions    Showing recent visits within past 7 days and meeting all other requirements  Today's Visits  Date Type Provider Dept   09/08/22 Telemedicine Phil Junior, [address] [place] [address] Primary Care   Showing today's visits and meeting all other requirements  Future Appointments  No visits were found meeting these conditions  Showing future appointments within next 150 days and meeting all other requirements     This virtual check-in was done via [facility] and patient was informed that this is a secure, HIPAA-compliant platform  He agrees to proceed  Patient agrees to participate in a virtual check in via telephone or video visit instead of presenting to the office to address urgent/immediate medical needs  Patient is aware this is a billable service  He acknowledged consent and understanding of privacy and security of the video platform  The patient has agreed to participate and understands they can discontinue the visit at any time  After connecting through [facility], the patient was identified by name and date of birth  Radha Self was informed that this was a telemedicine visit and that the exam was being conducted confidentially over secure lines  My office door was closed  No one else was in the room  Radha Self acknowledged consent and understanding of privacy and security of the telemedicine visit  I informed the patient that I have reviewed his record in Epic and presented the opportunity for him to ask any questions regarding the visit today  The patient agreed to participate  Verification of patient location:  Patient is located in the following state in which I hold an active license: [place]    Subjective:   Radha Self is a 44 y o  male who has been screened for COVID-19  Symptom change since last report: improving  Patient's symptoms include sore throat, cough and headache  Patient denies fever, chills, fatigue, malaise, congestion, rhinorrhea, anosmia, loss of taste, shortness of breath, chest tightness, abdominal pain, nausea, vomiting, diarrhea and myalgias  - Date of symptom onset: 9/3/2022  - Date of positive COVID-19 test: 9/7/2022  Type of test: Home antigen   Patient with typical symptoms of COVID-19 and they attest that they were positive on home rapid antigen testing  Image of positive result is not able to be uploaded into their chart  COVID-19 vaccination status: Fully vaccinated with booster    Marian Sanders has been staying home and has isolated themselves in his home  He is taking care to not share personal items and is cleaning all surfaces that are touched often, like counters, tabletops, and doorknobs using household cleaning sprays or wipes  He is wearing a mask when he leaves his room  Patient started with symptoms on Saturday, had a negative test on Sunday, and then retested yesterday  Yesterday's test was positive  Ear ache, neck stiff  Some nasal congestion as well  Sitting around made it worse  Lab Results   Component Value Date    SARSCOV2 Detected (A) 12/18/2020    [facility] Positive (Patient Reported) (A) 09/07/2022     Past Medical History:   Diagnosis Date    Back pain     Diverticulosis     GERD (gastroesophageal reflux disease)     Hemorrhoids      Past Surgical History:   Procedure Laterality Date    COLONOSCOPY      complete     Current Outpatient Medications   Medication Sig Dispense Refill    albuterol (PROVENTIL HFA,VENTOLIN HFA) 90 mcg/act inhaler TAKE 2 PUFFS BY MOUTH EVERY 6 HOURS AS NEEDED FOR WHEEZE 17 g 5    cyclobenzaprine (FLEXERIL) 5 mg tablet Take 1 tablet (5 mg total) by mouth 3 (three) times a day as needed for muscle spasms 60 tablet 5    Flowflex COVID-19 Ag Home Test KIT FOLLOW INSTRUCTIONS INCLUDED WITH THE PACKAGE   meloxicam (MOBIC) 15 mg tablet Take 1 tablet (15 mg total) by mouth daily 30 tablet 5     No current facility-administered medications for this visit  No Known Allergies    Review of Systems   Constitutional: Negative for appetite change, chills, diaphoresis, fatigue and fever  HENT: Positive for ear pain, postnasal drip, sinus pressure, sinus pain and sore throat   Negative for congestion, facial swelling, rhinorrhea, trouble swallowing and voice change  Respiratory: Positive for cough  Negative for chest tightness, shortness of breath and wheezing  Cardiovascular: Negative for chest pain  Gastrointestinal: Negative for abdominal pain, diarrhea, nausea and vomiting  Musculoskeletal: Positive for neck pain and neck stiffness  Negative for myalgias  Neurological: Positive for headaches  Objective:    Vitals:    09/08/22 0814   Temp: (!) 96 5 °F (35 8 °C)   TempSrc: Oral   Weight: (!) 147 kg (324 lb)   Height: 5' 11" (1 803 m)       Physical Exam  Nursing note reviewed  Constitutional:       Appearance: Normal appearance  Comments: Physical exam within the confines of virtual visit, video   Pulmonary:      Effort: Pulmonary effort is normal       Breath sounds: Normal breath sounds  Neurological:      Mental Status: He is alert

## 2022-09-08 NOTE — LETTER
September 8, 2022     Patient: Curry Claude  YOB: 1985  Date of Visit: 9/8/2022      To Whom it May Concern:    Curry Claude is under my professional care  Alyse Raya was seen in my office on 9/8/2022  Alyse Raya may return to work on 12Sept2022, and wear a mask for 5 days more  COVID home test positive on 7Sept2022  If you have any questions or concerns, please don't hesitate to call           Sincerely,          Haile Montgomery MD        CC: No Recipients

## 2022-09-19 DIAGNOSIS — J98.01 BRONCHOSPASM: ICD-10-CM

## 2022-09-19 RX ORDER — ALBUTEROL SULFATE 90 UG/1
AEROSOL, METERED RESPIRATORY (INHALATION)
Qty: 17 G | Refills: 5 | Status: SHIPPED | OUTPATIENT
Start: 2022-09-19

## 2022-10-12 ENCOUNTER — RA CDI HCC (OUTPATIENT)
Dept: OTHER | Facility: HOSPITAL | Age: 37
End: 2022-10-12

## 2022-10-12 NOTE — PROGRESS NOTES
[facility]  coding opportunities          Chart Reviewed number of suggestions sent to Provider: 1    E66 01- Morbid (severe) obesity due to excess calories (HCC)  *BMI of 45 19- Diagnosis is in problem list and not assessed yet in 2022    If this is correct, please document and assess at your next visit  10/19/22     Patients Insurance     Commercial Insurance: [employer]

## 2022-10-19 ENCOUNTER — OFFICE VISIT (OUTPATIENT)
Dept: FAMILY MEDICINE CLINIC | Facility: CLINIC | Age: 37
End: 2022-10-19

## 2022-10-19 VITALS
RESPIRATION RATE: 20 BRPM | TEMPERATURE: 99.1 F | OXYGEN SATURATION: 98 % | HEART RATE: 67 BPM | HEIGHT: 71 IN | SYSTOLIC BLOOD PRESSURE: 126 MMHG | DIASTOLIC BLOOD PRESSURE: 80 MMHG | WEIGHT: 315 LBS | BODY MASS INDEX: 44.1 KG/M2

## 2022-10-19 DIAGNOSIS — M51.37 DDD (DEGENERATIVE DISC DISEASE), LUMBOSACRAL: Primary | ICD-10-CM

## 2022-10-19 DIAGNOSIS — Z30.09 VASECTOMY EVALUATION: ICD-10-CM

## 2022-10-19 PROBLEM — U07.1 COVID-19 VIRUS INFECTION: Status: RESOLVED | Noted: 2022-09-08 | Resolved: 2022-10-19

## 2022-10-19 NOTE — PROGRESS NOTES
Name: Emily Brizuela      : 1985      MRN: [id number]  Encounter Provider: Barclay Osler, MD  Encounter Date: 10/19/2022   Encounter department: [facility] PRIMARY CARE    Assessment & Plan     1  DDD (degenerative disc disease), lumbosacral  Assessment & Plan: On meloxicam and  flexeril      2  Vasectomy evaluation  -     Ambulatory Referral to Urology; Future           Subjective      F/u back pain-uses Meloxicam and flexeril as needed, had another  Episode of  covid in early September  From co-worker, had  Flare up of back pain when doing summer clean up,trying to keep active    Review of Systems   Constitutional: Positive for unexpected weight change  Negative for activity change, appetite change and fatigue  6  Lb gain   Respiratory: Negative for shortness of breath  Cardiovascular: Negative for chest pain  Musculoskeletal: Positive for back pain  Neurological: Negative for dizziness, weakness, numbness and headaches  Current Outpatient Medications on File Prior to Visit   Medication Sig   • albuterol (PROVENTIL HFA,VENTOLIN HFA) 90 mcg/act inhaler INHALE 2 PUFFS BY MOUTH EVERY 6 HOURS AS NEEDED FOR WHEEZE   • cyclobenzaprine (FLEXERIL) 5 mg tablet Take 1 tablet (5 mg total) by mouth 3 (three) times a day as needed for muscle spasms   • Flowflex COVID-19 Ag Home Test KIT FOLLOW INSTRUCTIONS INCLUDED WITH THE PACKAGE  • meloxicam (MOBIC) 15 mg tablet Take 1 tablet (15 mg total) by mouth daily       Objective     /80 (BP Location: Left arm, Patient Position: Sitting, Cuff Size: Large)   Pulse 67   Temp 99 1 °F (37 3 °C) (Tympanic)   Resp 20   Ht 5' 11" (1 803 m)   Wt (!) 150 kg (330 lb)   SpO2 98%   BMI 46 03 kg/m²     Physical Exam  Vitals reviewed  Constitutional:       Appearance: Normal appearance  He is obese  Cardiovascular:      Rate and Rhythm: Normal rate and regular rhythm  Pulses: Normal pulses  Heart sounds: Normal heart sounds  Pulmonary:      Effort: Pulmonary effort is normal       Breath sounds: Normal breath sounds  Musculoskeletal:         General: Tenderness present  Lumbar back: Spasms and tenderness present  Decreased range of motion  Lymphadenopathy:      Cervical: No cervical adenopathy  Neurological:      Mental Status: He is alert         Louis Lai MD

## 2023-02-13 ENCOUNTER — CONSULT (OUTPATIENT)
Dept: UROLOGY | Facility: MEDICAL CENTER | Age: 38
End: 2023-02-13

## 2023-02-13 VITALS
SYSTOLIC BLOOD PRESSURE: 138 MMHG | HEART RATE: 85 BPM | HEIGHT: 71 IN | DIASTOLIC BLOOD PRESSURE: 84 MMHG | OXYGEN SATURATION: 96 % | WEIGHT: 315 LBS | BODY MASS INDEX: 44.1 KG/M2

## 2023-02-13 DIAGNOSIS — Z30.09 VASECTOMY EVALUATION: ICD-10-CM

## 2023-02-13 NOTE — PROGRESS NOTES
HISTORY:     **See details or conversation below  Patient tells me he is not definitely sure he wants a vasectomy  Some issues exist whether he and his spouse are in agreement on this  See exam below  I told patient I can feel the vasa in the office today  However, with severe obesity, sometimes it is not possible to feel the vasa at the time of the vasectomy  He knows that if that were the case, we would need to stop and be scheduled for the OR  No prior  history  This patient has no children and would like to have a vasectomy  He is fairly sure they want no more children, and are aware that vasectomy is intended to be a permanent form of sterilization  He has been told and understands the following: We will order a semen analysis to check for sterility after three months, and that he must use protection during that time  No guarantee can be made of permanent sterility, and that failure of the procedure is not common, but can occur  Furthermore, spontaneous reestablishment of the flow sperm is quite rare but is a possible reason for failure of the procedure  Although it is not mandatory, if he wants to request another semen sample at any time in the future, to ensure continued sterility, he can do so, at his decision  Potential complications of the procedure include, but are not limited to:  Excessive bleeding which can cause significant swelling; infections; chronic lingering pain of the testicle; damage to the blood supply of the testicle, which might lead to testicular atrophy  The patient has told me he understands the above statements, and wishes to proceed with scheduling the vasectomy      The following portions of the patient's history were reviewed and updated as appropriate: allergies, current medications, past family history, past medical history, past social history, past surgical history and problem list     Review of Systems   All other systems reviewed and are negative  Objective:     Physical Exam  Constitutional:       Appearance: Normal appearance  He is obese  Genitourinary:     Comments: Penis testes normal, I can feel vasa on both sides  Neurological:      Mental Status: He is alert  No results found for: PSA]  BUN   Date Value Ref Range Status   12/14/2019 13 7 - 25 mg/dL Final     Creatinine   Date Value Ref Range Status   12/14/2019 0 82 0 60 - 1 35 mg/dL Final   03/21/2016 0 79 0 60 - 1 35 mg/dL Final     No components found for: CBC      Patient Active Problem List   Diagnosis   • DDD (degenerative disc disease), lumbosacral   • GERD (gastroesophageal reflux disease)   • Urinary urgency   • Morbid obesity with BMI of 40 0-44 9, adult ([facility] )        Diagnoses and all orders for this visit:    Vasectomy evaluation  -     Ambulatory Referral to Urology           Patient ID: Pavel Byrd is a 44 y o  male        Current Outpatient Medications:   •  albuterol (PROVENTIL HFA,VENTOLIN HFA) 90 mcg/act inhaler, INHALE 2 PUFFS BY MOUTH EVERY 6 HOURS AS NEEDED FOR WHEEZE, Disp: 17 g, Rfl: 5  •  cyclobenzaprine (FLEXERIL) 5 mg tablet, Take 1 tablet (5 mg total) by mouth 3 (three) times a day as needed for muscle spasms, Disp: 60 tablet, Rfl: 5  •  meloxicam (MOBIC) 15 mg tablet, Take 1 tablet (15 mg total) by mouth daily, Disp: 30 tablet, Rfl: 5    Past Medical History:   Diagnosis Date   • Back pain    • Diverticulosis    • GERD (gastroesophageal reflux disease)    • Hemorrhoids        Past Surgical History:   Procedure Laterality Date   • COLONOSCOPY      complete       Social History

## 2023-04-26 ENCOUNTER — OFFICE VISIT (OUTPATIENT)
Dept: FAMILY MEDICINE CLINIC | Facility: CLINIC | Age: 38
End: 2023-04-26

## 2023-04-26 VITALS
TEMPERATURE: 97.8 F | HEART RATE: 96 BPM | HEIGHT: 71 IN | BODY MASS INDEX: 44.1 KG/M2 | SYSTOLIC BLOOD PRESSURE: 124 MMHG | DIASTOLIC BLOOD PRESSURE: 84 MMHG | OXYGEN SATURATION: 98 % | WEIGHT: 315 LBS

## 2023-04-26 DIAGNOSIS — E66.01 CLASS 3 SEVERE OBESITY DUE TO EXCESS CALORIES WITHOUT SERIOUS COMORBIDITY WITH BODY MASS INDEX (BMI) OF 45.0 TO 49.9 IN ADULT (HCC): ICD-10-CM

## 2023-04-26 DIAGNOSIS — E66.01 MORBID OBESITY WITH BMI OF 45.0-49.9, ADULT (HCC): ICD-10-CM

## 2023-04-26 DIAGNOSIS — M51.37 DDD (DEGENERATIVE DISC DISEASE), LUMBOSACRAL: Primary | ICD-10-CM

## 2023-04-26 PROBLEM — E66.813 CLASS 3 SEVERE OBESITY WITHOUT SERIOUS COMORBIDITY WITH BODY MASS INDEX (BMI) OF 45.0 TO 49.9 IN ADULT (HCC): Status: ACTIVE | Noted: 2020-12-21

## 2023-04-26 NOTE — PROGRESS NOTES
"Name: Katya Crawford      : 1985      MRN: [id number]  Encounter Provider: Leonel Cruz MD  Encounter Date: 2023   Encounter department: [facility] PRIMARY CARE    Assessment & Plan     1  DDD (degenerative disc disease), lumbosacral    2  Class 3 severe obesity due to excess calories without serious comorbidity with body mass index (BMI) of 45 0 to 49 9 in adult [facility])  Assessment & Plan:  counselled      3  Morbid obesity with BMI of 45 0-49 9, adult ([address] )         Subjective      Here for  F/u djd back, still  Struggling with weight, trying to watch diet, just got  Bike  To exercise  More, wants  Counseling for  wife    Review of Systems   Constitutional: Negative for activity change, appetite change, fatigue and unexpected weight change  Respiratory: Negative for shortness of breath  Cardiovascular: Negative for chest pain  Musculoskeletal: Positive for back pain  Neurological: Negative for dizziness and headaches  Psychiatric/Behavioral: The patient is not nervous/anxious  Current Outpatient Medications on File Prior to Visit   Medication Sig   • albuterol (PROVENTIL HFA,VENTOLIN HFA) 90 mcg/act inhaler INHALE 2 PUFFS BY MOUTH EVERY 6 HOURS AS NEEDED FOR WHEEZE   • cyclobenzaprine (FLEXERIL) 5 mg tablet Take 1 tablet (5 mg total) by mouth 3 (three) times a day as needed for muscle spasms   • meloxicam (MOBIC) 15 mg tablet Take 1 tablet (15 mg total) by mouth daily       Objective     /84 (BP Location: Left arm, Patient Position: Sitting, Cuff Size: Large)   Pulse 96   Temp 97 8 °F (36 6 °C) (Temporal)   Ht 5' 11\" (1 803 m)   Wt (!) 150 kg (331 lb 8 oz)   SpO2 98%   BMI 46 23 kg/m²     Physical Exam  Vitals reviewed  Constitutional:       Appearance: Normal appearance  He is obese  Cardiovascular:      Rate and Rhythm: Normal rate and regular rhythm  Pulses: Normal pulses  Heart sounds: Normal heart sounds     Pulmonary:      Effort: Pulmonary effort " is normal       Breath sounds: Normal breath sounds  Musculoskeletal:         General: Tenderness present  Right lower leg: Edema present  Left lower leg: Edema present  Comments: 1  Plus edema, mild back pain   Lymphadenopathy:      Cervical: No cervical adenopathy  Neurological:      Mental Status: He is alert  Michelle Pearson MD  BMI Counseling: Body mass index is 46 23 kg/m²  The BMI is above normal  Nutrition recommendations include reducing portion sizes, decreasing overall calorie intake, 3-5 servings of fruits/vegetables daily, reducing fast food intake and consuming healthier snacks  Exercise recommendations include exercising 3-5 times per week

## 2023-04-26 NOTE — PATIENT INSTRUCTIONS
Check into price of [place], increase  exercise,await lab  Obesity   AMBULATORY CARE:   Obesity  means your body mass index (BMI) is greater than 30  Your healthcare provider will use your age, height, and weight to measure your BMI  The risks of obesity include  many health problems, including injuries or physical disability  Diabetes (high blood sugar level)    High blood pressure or high cholesterol    Heart disease    Stroke    Gallbladder or liver disease    Cancer of the colon, breast, prostate, liver, or kidney    Sleep apnea    Arthritis or gout    Screening  is done to check for health conditions before you have signs or symptoms  If you are 28to 79years old, your blood sugar level may be checked every 3 years for signs of prediabetes or diabetes  Your healthcare provider will check your blood pressure at each visit  High blood pressure can lead to a stroke or other problems  Your provider may check for signs of heart disease, cancer, or other health problems  Seek care immediately if:   You have a severe headache, confusion, or difficulty speaking  You have weakness on one side of your body  You have chest pain, sweating, or shortness of breath  Call your doctor if:   You have symptoms of gallbladder or liver disease, such as pain in your upper abdomen  You have knee or hip pain and discomfort while walking  You have symptoms of diabetes, such as intense hunger and thirst, and frequent urination  You have symptoms of sleep apnea, such as snoring or daytime sleepiness  You have questions or concerns about your condition or care  Treatment for obesity  focuses on helping you lose weight to improve your health  Even a small decrease in BMI can reduce the risk for many health problems  Your healthcare provider will help you set a weight-loss goal   Lifestyle changes  are the first step in treating obesity   These include making healthy food choices and getting regular physical activity  Your healthcare provider may suggest a weight-loss program that involves coaching, education, and therapy  Medicine  may help you lose weight when it is used with a healthy foods and physical activity  Surgery  can help you lose weight if you have obesity along with other health problems  Several types of weight-loss surgery are available  Ask your healthcare provider for more information  Tips for safe weight loss:   Set small, realistic goals  An example of a small goal is to walk for 20 minutes 5 days a week  Anther goal is to lose 5% of your body weight  Ask for support  Tell friends, family members, and coworkers about your goals  Ask someone to lose weight with you  You may also want to join a weight-loss support group  Identify foods or triggers that may cause you to overeat  Remove tempting high-calorie foods from your home and workplace  Place a bowl of fresh fruit on your kitchen counter  If stress causes you to eat, find other ways to cope with stress  A counselor or therapist may be able to help you  Track your daily calories and activity  Write down what you eat and drink  Also write down how many minutes of physical activity you do each day  Track your weekly weight  Weigh yourself in the morning, before you eat or drink anything but after you use the bathroom  Use the same scale, in the same place, and in similar clothing each time  Only weigh yourself 1 to 2 times each week, or as directed  You may become discouraged if you weigh yourself every day  Eating changes: You will need to eat 500 to 1,000 fewer calories each day than you currently eat to lose 1 to 2 pounds a week  The following changes will help you cut calories:  Eat smaller portions  Use small plates, no larger than 9 inches in diameter  Fill your plate half full of fruits and vegetables  Measure your food using measuring cups until you know what a serving size looks like           Eat 3 meals and 1 or 2 snacks each day  Plan your meals in advance  Gema Mares and eat at home most of the time  Eat slowly  Do not skip meals  Skipping meals can lead to overeating later in the day  This can make it harder for you to lose weight  Talk with a dietitian to help you make a meal plan and schedule that is right for you  Eat fruits and vegetables at every meal   They are low in calories and high in fiber, which makes you feel full  Do not add butter, margarine, or cream sauce to vegetables  Use herbs to season steamed vegetables  Eat less fat and fewer fried foods  Eat more baked or grilled chicken and fish  These protein sources are lower in calories and fat than red meat  Limit fast food  Dress your salads with olive oil and vinegar instead of bottled dressing  Limit the amount of sugar you eat  Do not drink sugary beverages  Limit alcohol  Activity changes:  Physical activity is good for your body in many ways  It helps you burn calories and build strong muscles  It decreases stress and depression, and improves your mood  It can also help you sleep better  Talk to your healthcare provider before you begin an exercise program   Exercise for at least 30 minutes 5 days a week  Start slowly  Set aside time each day for physical activity that you enjoy and that is convenient for you  It is best to do both weight training and an activity that increases your heart rate, such as walking, bicycling, or swimming  Find ways to be more active  Do yard work and housecleaning  Walk up the stairs instead of using elevators  Spend your leisure time going to events that require walking, such as outdoor festivals or fairs  This extra physical activity can help you lose weight and keep it off  Follow up with your doctor as directed: You may need to meet with a dietitian  Write down your questions so you remember to ask them during your visits    © Copyright Mora Chavez 2022 Information is for End User's use only and may not be sold, redistributed or otherwise used for commercial purposes  The above information is an  only  It is not intended as medical advice for individual conditions or treatments  Talk to your doctor, nurse or pharmacist before following any medical regimen to see if it is safe and effective for you  Weight Management   AMBULATORY CARE:   Why it is important to manage your weight:  Being overweight increases your risk of health conditions such as heart disease, high blood pressure, type 2 diabetes, and certain types of cancer  It can also increase your risk for osteoarthritis, sleep apnea, and other respiratory problems  Aim for a slow, steady weight loss  Even a small amount of weight loss can lower your risk of health problems  Risks of being overweight:  Extra weight can cause many health problems, including the following:  Diabetes (high blood sugar level)    High blood pressure or high cholesterol    Heart disease    Stroke    Gallbladder or liver disease    Cancer of the colon, breast, prostate, liver, or kidney    Sleep apnea    Arthritis or gout    Screening  is done to check for health conditions before you have signs or symptoms  If you are 28to 79years old, your blood sugar level may be checked every 3 years for signs of prediabetes or diabetes  Your healthcare provider will check your blood pressure at each visit  High blood pressure can lead to a stroke or other problems  Your provider may check for signs of heart disease, cancer, or other health problems  How to lose weight safely:  A safe and healthy way to lose weight is to eat fewer calories and get regular exercise  You can lose up about 1 pound a week by decreasing the number of calories you eat by 500 calories each day  You can decrease calories by eating smaller portion sizes or by cutting out high-calorie foods  Read labels to find out how many calories are in the foods you eat           You can also burn calories with exercise such as walking, swimming, or biking  You will be more likely to keep weight off if you make these changes part of your lifestyle  Exercise at least 30 minutes per day on most days of the week  You can also fit in more physical activity by taking the stairs instead of the elevator or parking farther away from stores  Ask your healthcare provider about the best exercise plan for you  Healthy meal plan for weight management:  A healthy meal plan includes a variety of foods, contains fewer calories, and helps you stay healthy  A healthy meal plan includes the following:     Eat whole-grain foods more often  A healthy meal plan should contain fiber  Fiber is the part of grains, fruits, and vegetables that is not broken down by your body  Whole-grain foods are healthy and provide extra fiber in your diet  Some examples of whole-grain foods are whole-wheat breads and pastas, oatmeal, brown rice, and bulgur  Eat a variety of vegetables every day  Include dark, leafy greens such as spinach, kale, brandon greens, and mustard greens  Eat yellow and orange vegetables such as carrots, sweet potatoes, and winter squash  Eat a variety of fruits every day  Choose fresh or canned fruit (canned in its own juice or light syrup) instead of juice  Fruit juice has very little or no fiber  Eat low-fat dairy foods  Drink fat-free (skim) milk or 1% milk  Eat fat-free yogurt and low-fat cottage cheese  Try low-fat cheeses such as mozzarella and other reduced-fat cheeses  Choose meat and other protein foods that are low in fat  Choose beans or other legumes such as split peas or lentils  Choose fish, skinless poultry (chicken or turkey), or lean cuts of red meat (beef or pork)  Before you cook meat or poultry, cut off any visible fat  Use less fat and oil  Try baking foods instead of frying them  Add less fat, such as margarine, sour cream, regular salad dressing and mayonnaise to foods  Eat fewer high-fat foods  Some examples of high-fat foods include french fries, doughnuts, ice cream, and cakes  Eat fewer sweets  Limit foods and drinks that are high in sugar  This includes candy, cookies, regular soda, and sweetened drinks  Ways to decrease calories:   Eat smaller portions  Use a small plate with smaller servings  Do not eat second helpings  When you eat at a restaurant, ask for a box and place half of your meal in the box before you eat  Share an entrée with someone else  Replace high-calorie snacks with healthy, low-calorie snacks  Choose fresh fruit, vegetables, fat-free rice cakes, or air-popped popcorn instead of potato chips, nuts, or chocolate  Choose water or calorie-free drinks instead of soda or sweetened drinks  Do not shop for groceries when you are hungry  You may be more likely to make unhealthy food choices  Take a grocery list of healthy foods and shop after you have eaten  Eat regular meals  Do not skip meals  Skipping meals can lead to overeating later in the day  This can make it harder for you to lose weight  Eat a healthy snack in place of a meal if you do not have time to eat a regular meal  Talk with a dietitian to help you create a meal plan and schedule that is right for you  Other things to consider as you try to lose weight:   Be aware of situations that may give you the urge to overeat, such as eating while watching television  Find ways to avoid these situations  For example, read a book, go for a walk, or do crafts  Meet with a weight loss support group or friends who are also trying to lose weight  This may help you stay motivated to continue working on your weight loss goals  © Copyright Lluvia Bradley 2022 Information is for End User's use only and may not be sold, redistributed or otherwise used for commercial purposes  The above information is an  only   It is not intended as medical advice for individual conditions or treatments  Talk to your doctor, nurse or pharmacist before following any medical regimen to see if it is safe and effective for you

## 2023-06-04 DIAGNOSIS — M51.37 DDD (DEGENERATIVE DISC DISEASE), LUMBOSACRAL: ICD-10-CM

## 2023-06-04 DIAGNOSIS — M51.37 DEGENERATION OF INTERVERTEBRAL DISC OF LUMBOSACRAL REGION: ICD-10-CM

## 2023-06-05 RX ORDER — CYCLOBENZAPRINE HCL 5 MG
TABLET ORAL
Qty: 60 TABLET | Refills: 5 | Status: SHIPPED | OUTPATIENT
Start: 2023-06-05

## 2023-06-05 RX ORDER — MELOXICAM 15 MG/1
15 TABLET ORAL DAILY
Qty: 30 TABLET | Refills: 5 | Status: SHIPPED | OUTPATIENT
Start: 2023-06-05

## 2023-10-22 LAB
ALBUMIN SERPL-MCNC: 4.8 G/DL (ref 3.6–5.1)
ALBUMIN/GLOB SERPL: 1.9 (CALC) (ref 1–2.5)
ALP SERPL-CCNC: 64 U/L (ref 36–130)
ALT SERPL-CCNC: 32 U/L (ref 9–46)
AST SERPL-CCNC: 25 U/L (ref 10–40)
BILIRUB SERPL-MCNC: 0.7 MG/DL (ref 0.2–1.2)
BUN SERPL-MCNC: 18 MG/DL (ref 7–25)
BUN/CREAT SERPL: ABNORMAL (CALC) (ref 6–22)
CALCIUM SERPL-MCNC: 9.9 MG/DL (ref 8.6–10.3)
CHLORIDE SERPL-SCNC: 104 MMOL/L (ref 98–110)
CHOLEST SERPL-MCNC: 156 MG/DL
CHOLEST/HDLC SERPL: 3.8 (CALC)
CO2 SERPL-SCNC: 29 MMOL/L (ref 20–32)
CORTIS AM PEAK SERPL-MCNC: 18.8 MCG/DL
CREAT SERPL-MCNC: 0.68 MG/DL (ref 0.6–1.26)
GFR/BSA.PRED SERPLBLD CYS-BASED-ARV: 121 ML/MIN/1.73M2
GLOBULIN SER CALC-MCNC: 2.5 G/DL (CALC) (ref 1.9–3.7)
GLUCOSE SERPL-MCNC: 104 MG/DL (ref 65–99)
HDLC SERPL-MCNC: 41 MG/DL
LDLC SERPL CALC-MCNC: 99 MG/DL (CALC)
NONHDLC SERPL-MCNC: 115 MG/DL (CALC)
POTASSIUM SERPL-SCNC: 4.3 MMOL/L (ref 3.5–5.3)
PROT SERPL-MCNC: 7.3 G/DL (ref 6.1–8.1)
SODIUM SERPL-SCNC: 142 MMOL/L (ref 135–146)
TRIGL SERPL-MCNC: 72 MG/DL
TSH SERPL-ACNC: 2.23 MIU/L (ref 0.4–4.5)

## 2023-10-30 ENCOUNTER — OFFICE VISIT (OUTPATIENT)
Dept: FAMILY MEDICINE CLINIC | Facility: CLINIC | Age: 38
End: 2023-10-30

## 2023-10-30 VITALS
HEART RATE: 90 BPM | HEIGHT: 71 IN | SYSTOLIC BLOOD PRESSURE: 110 MMHG | BODY MASS INDEX: 34.86 KG/M2 | DIASTOLIC BLOOD PRESSURE: 74 MMHG | OXYGEN SATURATION: 98 % | WEIGHT: 249 LBS

## 2023-10-30 DIAGNOSIS — M51.37 DDD (DEGENERATIVE DISC DISEASE), LUMBOSACRAL: Primary | ICD-10-CM

## 2023-10-30 RX ORDER — BETAMETHASONE DIPROPIONATE 0.5 MG/G
CREAM TOPICAL
COMMUNITY
Start: 2023-10-04

## 2023-10-30 NOTE — LETTER
To Whom It May Concern:   Avila Lopez  1985 remains on the following restrictions : No lifting of greater than 45  Pounds without assistance, no snow shoveling but may use equipment for snow removal and needs to take a short break of 10 to 15  Minutes every 2  Hours due to back pain. Feel free to call me with any questions    Sincerely,      Kerri Kinney.  Jimmy Mireles M.D.

## 2023-10-30 NOTE — PROGRESS NOTES
Name: Shann Boxer      : 1985      MRN: [id number]  Encounter Provider: Curly Packer MD  Encounter Date: 10/30/2023   Encounter department: [facility] PRIMARY CARE    Assessment & Plan     1. DDD (degenerative disc disease), lumbosacral  Assessment & Plan:  Stable  w/o meds          Depression Screening and Follow-up Plan: Patient was screened for depression during today's encounter. They screened negative with a PHQ-2 score of 0. Subjective      Lost 83  lb  on [employer], relatively  happy  at  this weight, may try to lose  another 1--15 lb but  no more, unfortunately  back still bothering him, did do lab and bs  in borderline range, no cp, no sob, still has  back pain      Review of Systems   Constitutional:  Positive for unexpected weight change. Negative for activity change, appetite change and fatigue. 83 lb weight loss   Respiratory:  Negative for shortness of breath. Cardiovascular:  Negative for chest pain. Musculoskeletal:  Positive for back pain. Neurological:  Positive for numbness. Occ numbness       Current Outpatient Medications on File Prior to Visit   Medication Sig    albuterol (PROVENTIL HFA,VENTOLIN HFA) 90 mcg/act inhaler INHALE 2 PUFFS BY MOUTH EVERY 6 HOURS AS NEEDED FOR WHEEZE    cyclobenzaprine (FLEXERIL) 5 mg tablet TAKE 1 TABLET BY MOUTH THREE TIMES A DAY AS NEEDED FOR MUSCLE SPASMS    meloxicam (MOBIC) 15 mg tablet TAKE 1 TABLET (15 MG TOTAL) BY MOUTH DAILY. betamethasone, augmented, (DIPROLENE-AF) 0.05 % cream        Objective     /74 (BP Location: Right arm, Patient Position: Sitting, Cuff Size: Large)   Pulse 90   Ht 5' 11" (1.803 m)   Wt 113 kg (249 lb)   SpO2 98%   BMI 34.73 kg/m²     Physical Exam  Vitals reviewed. Constitutional:       Appearance: Normal appearance. He is obese. Cardiovascular:      Rate and Rhythm: Normal rate and regular rhythm. Pulses: Normal pulses.       Heart sounds: Normal heart sounds. Pulmonary:      Effort: Pulmonary effort is normal.      Breath sounds: Normal breath sounds. Musculoskeletal:         General: Tenderness present. Right lower leg: No edema. Left lower leg: No edema. Lymphadenopathy:      Cervical: No cervical adenopathy. Neurological:      Mental Status: He is alert.    Psychiatric:         Mood and Affect: Mood normal.       Joelle Davis MD

## 2023-11-01 ENCOUNTER — OFFICE VISIT (OUTPATIENT)
Dept: FAMILY MEDICINE CLINIC | Facility: CLINIC | Age: 38
End: 2023-11-01
Payer: COMMERCIAL

## 2023-11-01 VITALS
WEIGHT: 249 LBS | HEIGHT: 71 IN | DIASTOLIC BLOOD PRESSURE: 80 MMHG | RESPIRATION RATE: 18 BRPM | HEART RATE: 68 BPM | SYSTOLIC BLOOD PRESSURE: 128 MMHG | OXYGEN SATURATION: 99 % | BODY MASS INDEX: 34.86 KG/M2

## 2023-11-01 DIAGNOSIS — M51.37 DDD (DEGENERATIVE DISC DISEASE), LUMBOSACRAL: Primary | ICD-10-CM

## 2023-11-01 DIAGNOSIS — Z23 ENCOUNTER FOR IMMUNIZATION: ICD-10-CM

## 2023-11-01 DIAGNOSIS — I82.4Y1 ACUTE DEEP VEIN THROMBOSIS (DVT) OF PROXIMAL VEIN OF RIGHT LOWER EXTREMITY (HCC): ICD-10-CM

## 2023-11-01 DIAGNOSIS — K21.9 GASTROESOPHAGEAL REFLUX DISEASE WITHOUT ESOPHAGITIS: ICD-10-CM

## 2023-11-01 PROCEDURE — 99214 OFFICE O/P EST MOD 30 MIN: CPT | Performed by: FAMILY MEDICINE

## 2023-11-01 PROCEDURE — 90686 IIV4 VACC NO PRSV 0.5 ML IM: CPT | Performed by: FAMILY MEDICINE

## 2023-11-01 PROCEDURE — 90471 IMMUNIZATION ADMIN: CPT | Performed by: FAMILY MEDICINE

## 2023-11-01 NOTE — ASSESSMENT & PLAN NOTE
Patient does have what appears to be disc issues, causing some radiation of symptoms down into the right lower extremity. This is different than was noted on the MRI in 2018 when it showed left issues. Consider x-ray, MRI. We will order lumbar, right hip, pelvis, and ultrasound to rule out DVT, though I doubt that this is the issue. As far as treatment, consider using meloxicam which she had previously. Again, we will need to rule out other issues as well. Heat, ice, gentle range of motion.

## 2023-11-01 NOTE — ASSESSMENT & PLAN NOTE
Patient reports not really having any specific issues at the moment. Prior to weight loss he was having more problems.   If he notices increased symptoms while he is on meloxicam, start with famotidine, and then increase to Prilosec if necessary

## 2023-11-01 NOTE — PATIENT INSTRUCTIONS
1. DDD (degenerative disc disease), lumbosacral  Assessment & Plan:  Patient does have what appears to be disc issues, causing some radiation of symptoms down into the right lower extremity. This is different than was noted on the MRI in 2018 when it showed left issues. Consider x-ray, MRI. We will order lumbar, right hip, pelvis, and ultrasound to rule out DVT, though I doubt that this is the issue. As far as treatment, consider using meloxicam which she had previously. Again, we will need to rule out other issues as well. Heat, ice, gentle range of motion. Orders:  -     XR spine lumbar minimum 4 views non injury; Future; Expected date: 11/01/2023  -     XR hip/pelv 2-3 vws right if performed; Future; Expected date: 11/01/2023    2. Acute deep vein thrombosis (DVT) of proximal vein of right lower extremity ([address])  Comments:  Patient mention possibility of this, though unlikely. Orders:  -     VAS lower limb venous duplex study, unilateral/limited; Future; Expected date: 11/01/2023    3. Gastroesophageal reflux disease without esophagitis  Assessment & Plan:  Patient reports not really having any specific issues at the moment. Prior to weight loss he was having more problems. If he notices increased symptoms while he is on meloxicam, start with famotidine, and then increase to Prilosec if necessary          COVID 19 Instructions    Felix Fraser was advised to limit contact with others to essential tasks such as getting food, medications, and medical care. Proper handwashing reviewed, and Hand sanitzer when washing is not available. If the patient develops symptoms of COVID 19, the patient should call the office as soon as possible. It is strongly recommended that Flu Vaccinations be obtained. Virtual Visits:  Sasha: This works on smart phones (any phone with Internet browsing capability). You should get a text message when the provider is ready to see you.   Click on the link in the text message, and the call should start. You will need to type in your name, and allow camera and microphone access. This is HIPPA compliant, and secure. If you have not already done so, get immunized to COVID 19. We are committed to getting you vaccinated as soon as possible and will be closely following CDC and [facility] guidelines as they are released and revised. Please refer to our COVID-19 vaccine webpage for the most up to date information on the vaccine and our distribution efforts. This site will also have the most up to date recommendations for COVID booster vaccine. [email or web address]    Call [phone] ([phone]), option 7    You can also visit [email or web address].[email or web address] to find vaccines in your area. OUR LOCATION:    [facility]  [address] [facility], [address] [place], [place], [address]  Fax: [phone]    Lab services, Rheumatology, and OB/GYN are at this location as well.

## 2023-11-01 NOTE — PROGRESS NOTES
Name: Shann Boxer      :       MRN: [id number]  Encounter Provider: Chan Shin MD  Encounter Date: 2023   Encounter department: [facility] PRIMARY CARE    Assessment & Plan     1. DDD (degenerative disc disease), lumbosacral  Assessment & Plan:  Patient does have what appears to be disc issues, causing some radiation of symptoms down into the right lower extremity. This is different than was noted on the MRI in 2018 when it showed left issues. Consider x-ray, MRI. We will order lumbar, right hip, pelvis, and ultrasound to rule out DVT, though I doubt that this is the issue. As far as treatment, consider using meloxicam which she had previously. Again, we will need to rule out other issues as well. Heat, ice, gentle range of motion. Orders:  -     XR spine lumbar minimum 4 views non injury; Future; Expected date: 2023  -     XR hip/pelv 2-3 vws right if performed; Future; Expected date: 2023    2. Acute deep vein thrombosis (DVT) of proximal vein of right lower extremity ([address])  Comments:  Patient mention possibility of this, though unlikely. Orders:  -     VAS lower limb venous duplex study, unilateral/limited; Future; Expected date: 2023    3. Gastroesophageal reflux disease without esophagitis  Assessment & Plan:  Patient reports not really having any specific issues at the moment. Prior to weight loss he was having more problems. If he notices increased symptoms while he is on meloxicam, start with famotidine, and then increase to Prilosec if necessary        Depression Screening and Follow-up Plan: Patient was screened for depression during today's encounter. They screened negative with a PHQ-2 score of 0. Subjective      Chief Complaint   Patient presents with   • Leg Pain       Leg pain and numbness on the right. Has been for about a week. Just noted when laying in bed.   Pain posterior of the leg, proximal.  No specific back issues with this. No rash. No medications yet. Getting worse over the week, noting numbness in the foot now as well. Has not had exactly the same, but similar. Does do physical job. Family urged him to come in due to some family that had blood clots before. Review of Systems   Constitutional: Negative. HENT: Negative. Respiratory: Negative. Musculoskeletal:         Per HPI         Current Outpatient Medications on File Prior to Visit   Medication Sig   • meloxicam (MOBIC) 15 mg tablet TAKE 1 TABLET (15 MG TOTAL) BY MOUTH DAILY. • albuterol (PROVENTIL HFA,VENTOLIN HFA) 90 mcg/act inhaler INHALE 2 PUFFS BY MOUTH EVERY 6 HOURS AS NEEDED FOR WHEEZE   • betamethasone, augmented, (DIPROLENE-AF) 0.05 % cream    • cyclobenzaprine (FLEXERIL) 5 mg tablet TAKE 1 TABLET BY MOUTH THREE TIMES A DAY AS NEEDED FOR MUSCLE SPASMS       Objective     /80 (BP Location: Right arm, Patient Position: Sitting, Cuff Size: Large)   Pulse 68   Resp 18   Ht 5' 11" (1.803 m) Comment: refused measurement  Wt 113 kg (249 lb) Comment: refused measurment  SpO2 99%   BMI 34.73 kg/m²     Physical Exam  Musculoskeletal:      Lumbar back: Tenderness present. No bony tenderness. Decreased range of motion (Increased discomfort with extension, side bending. Flexion seemed okay. ).  Negative right straight leg raise test and negative left straight leg raise test.      Right upper leg: Normal.      Left upper leg: Normal.       Sarita Corona MD

## 2023-11-04 ENCOUNTER — APPOINTMENT (OUTPATIENT)
Dept: RADIOLOGY | Age: 38
End: 2023-11-04
Payer: COMMERCIAL

## 2023-11-04 DIAGNOSIS — M51.37 DDD (DEGENERATIVE DISC DISEASE), LUMBOSACRAL: ICD-10-CM

## 2023-11-04 PROCEDURE — 72110 X-RAY EXAM L-2 SPINE 4/>VWS: CPT

## 2023-11-04 PROCEDURE — 73502 X-RAY EXAM HIP UNI 2-3 VIEWS: CPT

## 2023-11-07 ENCOUNTER — HOSPITAL ENCOUNTER (OUTPATIENT)
Dept: NON INVASIVE DIAGNOSTICS | Facility: HOSPITAL | Age: 38
Discharge: HOME | End: 2023-11-07
Payer: COMMERCIAL

## 2023-11-07 DIAGNOSIS — I82.4Y1 ACUTE DEEP VEIN THROMBOSIS (DVT) OF PROXIMAL VEIN OF RIGHT LOWER EXTREMITY (HCC): ICD-10-CM

## 2023-11-07 PROCEDURE — 93971 EXTREMITY STUDY: CPT

## 2023-11-07 PROCEDURE — 93971 EXTREMITY STUDY: CPT | Performed by: SURGERY

## 2023-12-05 ENCOUNTER — OFFICE VISIT (OUTPATIENT)
Dept: FAMILY MEDICINE CLINIC | Facility: CLINIC | Age: 38
End: 2023-12-05
Payer: COMMERCIAL

## 2023-12-05 VITALS
DIASTOLIC BLOOD PRESSURE: 82 MMHG | SYSTOLIC BLOOD PRESSURE: 120 MMHG | WEIGHT: 254 LBS | HEART RATE: 72 BPM | BODY MASS INDEX: 35.56 KG/M2 | HEIGHT: 71 IN

## 2023-12-05 DIAGNOSIS — G57.11 NEUROPATHY OF RIGHT LATERAL FEMORAL CUTANEOUS NERVE: Primary | ICD-10-CM

## 2023-12-05 PROCEDURE — 99213 OFFICE O/P EST LOW 20 MIN: CPT | Performed by: FAMILY MEDICINE

## 2023-12-05 RX ORDER — GABAPENTIN 100 MG/1
100 CAPSULE ORAL 2 TIMES DAILY PRN
Qty: 60 CAPSULE | Refills: 1 | Status: SHIPPED | OUTPATIENT
Start: 2023-12-05

## 2023-12-05 NOTE — PATIENT INSTRUCTIONS
Gabapentin 100 bid  as needed, look  up exercises  for MP  Meralgia Paresthetica   WHAT YOU NEED TO KNOW:   What is meralgia paresthetica (MP)?  MP is a condition that causes numbness, tingling, and burning pain in your thigh. MP occurs when the nerve that provides feeling to the area is pinched. What increases my risk for MP? Tight clothes    Obesity    Pregnancy or a fibroid (growth) in your uterus    Scar tissue due to injury or surgery    Medical conditions, such as diabetes or lupus    Alcohol    Age 27 to 36 years    What are the symptoms of MP? You may have symptoms in one or both thighs. Your symptoms may get worse if you stand or walk for a long time. You may have any of the following:  Numbness and tingling in the outer part of your thigh    Burning, stinging, or aching in the front or outer part of your thigh    Lower back pain that goes down your legs    Skin that is extra sensitive to the touch    Dull, achy pain in your groin and buttocks    How is MP diagnosed? Your healthcare provider will examine you and ask about your symptoms. Tell him or her when they began and if anything makes your symptoms worse or better. Your provider may diagnose MP based on your symptoms. He or she may use any of the following to find a different cause of your symptoms: An x-ray, CT, or MRI  may show what is putting pressure on your nerve. You may be given contrast liquid to help the area show up better in the pictures. Tell the healthcare provider if you have ever had an allergic reaction to contrast liquid. Do not enter the MRI room with anything metal. Metal can cause serious injury. Tell the healthcare provider if you have any metal in or on your body. A nerve conduction study  measures the electrical activity of your nerves. How is MP treated? MP may go away without treatment after a few weeks or months.  If symptoms continue, you may need any of the following:  Medicines  may be given to relieve pain or decrease inflammation. Surgery  may be needed if your symptoms are severe and other treatments do not work. The nerve may be removed or the tissue around it cut to relieve pressure. How can I manage MP? Take pressure off the nerve. Wear loose clothing. Do not wear tight pants, belts, or other tight clothes. Do not walk or stand for long periods of time. Ask your provider what a healthy weight is for you. He or she can help you create a safe weight loss plan if you are overweight. Go to physical therapy, if directed. A physical therapist teaches you exercises to help improve movement and strength, and to decrease pain. Call your local emergency number (911 in the [address]) if:   You cannot feel or move your legs. When should I call my doctor? You have severe leg pain. Your symptoms do not improve with treatment. You have questions or concerns about your condition or care. CARE AGREEMENT:   You have the right to help plan your care. Learn about your health condition and how it may be treated. Discuss treatment options with your healthcare providers to decide what care you want to receive. You always have the right to refuse treatment. The above information is an  only. It is not intended as medical advice for individual conditions or treatments. Talk to your doctor, nurse or pharmacist before following any medical regimen to see if it is safe and effective for you. © Copyright Chalo Herrera 2023 Information is for End User's use only and may not be sold, redistributed or otherwise used for commercial purposes.

## 2023-12-05 NOTE — PROGRESS NOTES
Name: Lenin Boykin      : 1985      MRN: [id number]  Encounter Provider: Lucila Balderas MD  Encounter Date: 2023   Encounter department: [facility] PRIMARY CARE    Assessment & Plan     1. 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 They screened negative with a PHQ-2 score of 0. Subjective      Patient presents with: Follow-up: Right leg numbness  Montse De León is here for right leg numbness of his lateral femoral cutaneous nerve. He has known DJD of his lumbar spine at L5-S1 and L4/L5. 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 He has had the DJD of his back for a number of years but this numbness along the right side of his thigh is  new for him. Us  for  dvt  normal      Review of Systems   Constitutional:  Negative for activity change, appetite change and fatigue. Respiratory:  Negative for shortness of breath. Cardiovascular:  Negative for chest pain. Musculoskeletal:  Positive for back pain. Neurological:  Positive for numbness. Negative for dizziness, light-headedness and headaches. Current Outpatient Medications on File Prior to Visit   Medication Sig    cyclobenzaprine (FLEXERIL) 5 mg tablet TAKE 1 TABLET BY MOUTH THREE TIMES A DAY AS NEEDED FOR MUSCLE SPASMS    meloxicam (MOBIC) 15 mg tablet TAKE 1 TABLET (15 MG TOTAL) BY MOUTH DAILY.     [DISCONTINUED] betamethasone, augmented, (DIPROLENE-AF) 0.05 % cream     [DISCONTINUED] albuterol (PROVENTIL HFA,VENTOLIN HFA) 90 mcg/act inhaler INHALE 2 PUFFS BY MOUTH EVERY 6 HOURS AS NEEDED FOR WHEEZE (Patient not taking: Reported on 12/5/2023)       Objective     /82   Pulse 72   Ht 5' 11" (1.803 m)   Wt 115 kg (254 lb)   BMI 35.43 kg/m²     Physical Exam  Vitals reviewed. Constitutional:       Appearance: Normal appearance. He is obese. Cardiovascular:      Rate and Rhythm: Normal rate and regular rhythm. Pulses: Normal pulses. Heart sounds: Normal heart sounds. Pulmonary:      Effort: Pulmonary effort is normal.      Breath sounds: Normal breath sounds. Musculoskeletal:      Right lower leg: No edema. Left lower leg: No edema. Lymphadenopathy:      Cervical: No cervical adenopathy. Neurological:      Mental Status: He is alert. Sensory: Sensory deficit present.       Comments: Decreased sensation r  thigh compared with  l       Marcelo Sawyer MD

## 2024-03-20 ENCOUNTER — OFFICE VISIT (OUTPATIENT)
Dept: URGENT CARE | Age: 39
End: 2024-03-20
Payer: COMMERCIAL

## 2024-03-20 VITALS
OXYGEN SATURATION: 99 % | TEMPERATURE: 97 F | RESPIRATION RATE: 18 BRPM | HEART RATE: 76 BPM | SYSTOLIC BLOOD PRESSURE: 142 MMHG | DIASTOLIC BLOOD PRESSURE: 85 MMHG

## 2024-03-20 DIAGNOSIS — J01.91 ACUTE RECURRENT SINUSITIS, UNSPECIFIED LOCATION: Primary | ICD-10-CM

## 2024-03-20 PROCEDURE — 99213 OFFICE O/P EST LOW 20 MIN: CPT | Performed by: EMERGENCY MEDICINE

## 2024-03-20 RX ORDER — AMOXICILLIN AND CLAVULANATE POTASSIUM 875; 125 MG/1; MG/1
1 TABLET, FILM COATED ORAL 2 TIMES DAILY
Qty: 14 TABLET | Refills: 0 | Status: SHIPPED | OUTPATIENT
Start: 2024-03-20 | End: 2024-03-27

## 2024-03-20 RX ORDER — PREDNISONE 20 MG/1
40 TABLET ORAL DAILY
Qty: 10 TABLET | Refills: 0 | Status: SHIPPED | OUTPATIENT
Start: 2024-03-20 | End: 2024-03-25

## 2024-03-20 RX ORDER — FLUTICASONE PROPIONATE 50 MCG
2 SPRAY, SUSPENSION (ML) NASAL DAILY
Qty: 15.8 ML | Refills: 3 | Status: SHIPPED | OUTPATIENT
Start: 2024-03-20 | End: 2024-04-19

## 2024-03-20 NOTE — PROGRESS NOTES
[facility]        NAME: Avelino Espinoza is a 38 y.o. male  : 1985    MRN: [id number]  DATE: 2024  TIME: 3:37 PM    Assessment and Plan   Acute recurrent sinusitis, unspecified location [J01.91]  1. Acute recurrent sinusitis, unspecified location  amoxicillin-clavulanate (AUGMENTIN) 875-125 mg per tablet    predniSONE 20 mg tablet    fluticasone (FLONASE) 50 mcg/act nasal spray            Patient Instructions       Follow up with PCP in 3-5 days.  Proceed to  ER if symptoms worsen.    If tests have been performed at [facility], our office will contact you with results if changes need to be made to the care plan discussed with you at the visit.  You can review your full results on [facility].    Chief Complaint     Chief Complaint   Patient presents with    Cough     Cough with yellow/green mucous, congestion, ear ache, PND. Sx for one week. Home covid test negative 3 days ago.          History of Present Illness       38-year-old male with history of GERD presents with chief complaint of 1 week of persistent bilateral maxillary and frontal sinus pressure, along with copious mucopurulent nasal discharge and dry nonproductive cough.  He states symptoms initially started as mild and then progressively worsened.  He denies any headaches, fever, sore throat.  Patient states initially his symptoms started when he was out in the field with his family member and initially attributed symptoms to allergies however has tried taking over-the-counter allergy medication without relief.    Cough  This is a new problem. The current episode started in the past 7 days. The problem has been waxing and waning. The cough is Non-productive. Associated symptoms include ear congestion, ear pain, myalgias, nasal congestion, postnasal drip and rhinorrhea. Pertinent negatives include no chest pain, chills, fever, headaches, heartburn, hemoptysis, rash, sore throat, shortness of breath, sweats, weight loss or  wheezing. The symptoms are aggravated by lying down. He has tried nothing for the symptoms.       Review of Systems   Review of Systems   Constitutional:  Negative for activity change, appetite change, chills, diaphoresis, fatigue, fever, unexpected weight change and weight loss.   HENT:  Positive for congestion, ear pain, postnasal drip, rhinorrhea, sinus pressure and sinus pain. Negative for dental problem, drooling, ear discharge, facial swelling, hearing loss, mouth sores, nosebleeds, sneezing, sore throat, tinnitus, trouble swallowing and voice change.    Eyes:  Negative for pain and visual disturbance.   Respiratory:  Positive for cough. Negative for apnea, hemoptysis, choking, chest tightness, shortness of breath, wheezing and stridor.    Cardiovascular:  Negative for chest pain and palpitations.   Gastrointestinal:  Negative for abdominal distention, abdominal pain, anal bleeding, blood in stool, constipation, diarrhea, heartburn, nausea, rectal pain and vomiting.   Genitourinary:  Negative for dysuria, enuresis, flank pain, frequency, genital sores, hematuria, penile discharge, penile pain, penile swelling and scrotal swelling.   Musculoskeletal:  Positive for myalgias. Negative for arthralgias and back pain.   Skin:  Negative for color change and rash.   Neurological:  Negative for dizziness, tremors, seizures, syncope, facial asymmetry, speech difficulty, weakness, light-headedness, numbness and headaches.   All other systems reviewed and are negative.        Current Medications       Current Outpatient Medications:     amoxicillin-clavulanate (AUGMENTIN) 875-125 mg per tablet, Take 1 tablet by mouth 2 (two) times a day for 7 days, Disp: 14 tablet, Rfl: 0    fluticasone (FLONASE) 50 mcg/act nasal spray, 2 sprays into each nostril daily, Disp: 15.8 mL, Rfl: 3    predniSONE 20 mg tablet, Take 2 tablets (40 mg total) by mouth daily for 5 days, Disp: 10 tablet, Rfl: 0    cyclobenzaprine (FLEXERIL) 5 mg  tablet, TAKE 1 TABLET BY MOUTH THREE TIMES A DAY AS NEEDED FOR MUSCLE SPASMS (Patient not taking: Reported on 3/20/2024), Disp: 60 tablet, Rfl: 5    gabapentin (Neurontin) 100 mg capsule, Take 1 capsule (100 mg total) by mouth 2 (two) times a day as needed (numbness) (Patient not taking: Reported on 3/20/2024), Disp: 60 capsule, Rfl: 1    meloxicam (MOBIC) 15 mg tablet, TAKE 1 TABLET (15 MG TOTAL) BY MOUTH DAILY. (Patient not taking: Reported on 3/20/2024), Disp: 30 tablet, Rfl: 5    Current Allergies     Allergies as of 03/20/2024    (No Known Allergies)            The following portions of the patient's history were reviewed and updated as appropriate: allergies, current medications, past family history, past medical history, past social history, past surgical history and problem list.     Past Medical History:   Diagnosis Date    Back pain     Diverticulosis     GERD (gastroesophageal reflux disease)     Hemorrhoids        Past Surgical History:   Procedure Laterality Date    COLONOSCOPY      complete       Family History   Problem Relation Age of Onset    Mental illness Mother     Sarcoidosis Mother     Uterine cancer Mother     No Known Problems Father     Cancer Sister 39        thyroid    Diabetes type II Maternal Grandmother     Vision loss Maternal Grandmother     Heart disease Maternal Grandfather     Breast cancer Paternal Grandmother     Ovarian cancer Maternal Aunt     Breast cancer Paternal Aunt     Cancer Cousin 35        thyroid         Medications have been verified.        Objective   /85   Pulse 76   Temp (!) 97 °F (36.1 °C) (Tympanic)   Resp 18   SpO2 99%   No LMP for male patient.       Physical Exam     Physical Exam  Vitals and nursing note reviewed.   Constitutional:       General: He is not in acute distress.     Appearance: Normal appearance. He is normal weight. He is not ill-appearing or toxic-appearing.   HENT:      Head: Normocephalic and atraumatic.      Right Ear: Tympanic  membrane, ear canal and external ear normal. There is no impacted cerumen.      Left Ear: Tympanic membrane, ear canal and external ear normal. There is no impacted cerumen.      Nose: Mucosal edema, congestion and rhinorrhea present. No nasal deformity, septal deviation, signs of injury, laceration or nasal tenderness.      Right Nostril: No foreign body, epistaxis, septal hematoma or occlusion.      Left Nostril: No foreign body, epistaxis, septal hematoma or occlusion.      Right Turbinates: Enlarged and swollen. Not pale.      Left Turbinates: Enlarged and swollen. Not pale.      Right Sinus: Maxillary sinus tenderness and frontal sinus tenderness present.      Left Sinus: Maxillary sinus tenderness and frontal sinus tenderness present.      Mouth/Throat:      Mouth: Mucous membranes are moist.      Pharynx: Oropharynx is clear. No oropharyngeal exudate or posterior oropharyngeal erythema.   Eyes:      General: No scleral icterus.        Right eye: No discharge.         Left eye: No discharge.      Extraocular Movements: Extraocular movements intact.      Conjunctiva/sclera: Conjunctivae normal.      Pupils: Pupils are equal, round, and reactive to light.   Cardiovascular:      Rate and Rhythm: Normal rate and regular rhythm.      Pulses: Normal pulses.      Heart sounds: Normal heart sounds.   Pulmonary:      Effort: Pulmonary effort is normal. No respiratory distress.      Breath sounds: Normal breath sounds. No stridor. No wheezing, rhonchi or rales.   Chest:      Chest wall: No tenderness.   Abdominal:      General: Abdomen is flat.   Musculoskeletal:         General: No swelling, tenderness, deformity or signs of injury.      Cervical back: Normal range of motion and neck supple.      Left lower leg: No edema.   Skin:     General: Skin is warm and dry.      Capillary Refill: Capillary refill takes less than 2 seconds.   Neurological:      General: No focal deficit present.      Mental Status: He is alert  and oriented to person, place, and time.      Cranial Nerves: No cranial nerve deficit.      Sensory: No sensory deficit.      Motor: No weakness.      Coordination: Coordination normal.      Gait: Gait normal.   Psychiatric:         Mood and Affect: Mood normal.         Behavior: Behavior normal.

## 2024-03-27 ENCOUNTER — TELEPHONE (OUTPATIENT)
Age: 39
End: 2024-03-27

## 2024-03-27 DIAGNOSIS — J98.01 BRONCHOSPASM: Primary | ICD-10-CM

## 2024-03-27 RX ORDER — ALBUTEROL SULFATE 90 UG/1
2 AEROSOL, METERED RESPIRATORY (INHALATION) EVERY 6 HOURS PRN
Qty: 18 G | Refills: 5 | Status: SHIPPED | OUTPATIENT
Start: 2024-03-27

## 2024-03-27 NOTE — TELEPHONE ENCOUNTER
Refill request from patients pharmacy for his Albuterol Inhaler, not active on med list please advise.

## 2024-04-01 ENCOUNTER — APPOINTMENT (OUTPATIENT)
Dept: RADIOLOGY | Facility: MEDICAL CENTER | Age: 39
End: 2024-04-01
Payer: COMMERCIAL

## 2024-04-01 ENCOUNTER — OFFICE VISIT (OUTPATIENT)
Dept: FAMILY MEDICINE CLINIC | Facility: CLINIC | Age: 39
End: 2024-04-01
Payer: COMMERCIAL

## 2024-04-01 VITALS
OXYGEN SATURATION: 98 % | WEIGHT: 251 LBS | HEIGHT: 71 IN | DIASTOLIC BLOOD PRESSURE: 84 MMHG | SYSTOLIC BLOOD PRESSURE: 122 MMHG | BODY MASS INDEX: 35.14 KG/M2 | TEMPERATURE: 97.3 F | HEART RATE: 66 BPM

## 2024-04-01 DIAGNOSIS — R04.2 HEMOPTYSIS: ICD-10-CM

## 2024-04-01 DIAGNOSIS — R04.2 HEMOPTYSIS: Primary | ICD-10-CM

## 2024-04-01 DIAGNOSIS — J01.91 ACUTE RECURRENT SINUSITIS, UNSPECIFIED LOCATION: ICD-10-CM

## 2024-04-01 DIAGNOSIS — J40 BRONCHITIS: ICD-10-CM

## 2024-04-01 PROCEDURE — 71046 X-RAY EXAM CHEST 2 VIEWS: CPT

## 2024-04-01 PROCEDURE — 99213 OFFICE O/P EST LOW 20 MIN: CPT | Performed by: FAMILY MEDICINE

## 2024-04-01 RX ORDER — CEFUROXIME AXETIL 500 MG/1
500 TABLET ORAL EVERY 12 HOURS SCHEDULED
Qty: 20 TABLET | Refills: 0 | Status: SHIPPED | OUTPATIENT
Start: 2024-04-01 | End: 2024-04-11

## 2024-04-01 NOTE — PROGRESS NOTES
Name: Avelino Espinoza      : 1985      MRN: [id number]  Encounter Provider: Sparkle Jacobo MD  Encounter Date: 2024   Encounter department: [facility] PRIMARY CARE    Assessment & Plan     1. Hemoptysis  -     XR chest pa & lateral; Future; Expected date: 2024    2. Acute recurrent sinusitis, unspecified location  -     cefuroxime (CEFTIN) 500 mg tablet; Take 1 tablet (500 mg total) by mouth every 12 (twelve) hours for 10 days    3. Bronchitis  -     cefuroxime (CEFTIN) 500 mg tablet; Take 1 tablet (500 mg total) by mouth every 12 (twelve) hours for 10 days  -     XR chest pa & lateral; Future; Expected date: 2024           Subjective      Sore Throat   This is a new problem. The current episode started 1 to 4 weeks ago. The problem has been waxing and waning. The pain is worse on the left side. There has been no fever. Associated symptoms include coughing and ear pain. Pertinent negatives include no abdominal pain, congestion, diarrhea, drooling, ear discharge, headaches, hoarse voice, plugged ear sensation, neck pain, shortness of breath, stridor, swollen glands, trouble swallowing or vomiting. He has had no exposure to strep or mono. He has tried acetaminophen and cool liquids (previously  on Augmentin and  Prednisone) for the symptoms. The treatment provided mild relief.     Review of Systems   Constitutional:  Negative for activity change, appetite change and fatigue.   HENT:  Positive for ear pain, sinus pressure, sinus pain and sore throat. Negative for congestion, drooling, ear discharge, hoarse voice and trouble swallowing.    Respiratory:  Positive for cough. Negative for shortness of breath and stridor.         Occasional  coughing  flecks  of  blood   Gastrointestinal:  Negative for abdominal pain, diarrhea and vomiting.   Musculoskeletal:  Negative for neck pain.   Neurological:  Negative for headaches.   Hematological:  Negative for adenopathy.       Current Outpatient  "Medications on File Prior to Visit   Medication Sig    albuterol (Ventolin HFA) 90 mcg/act inhaler Inhale 2 puffs every 6 (six) hours as needed for wheezing    fluticasone (FLONASE) 50 mcg/act nasal spray 2 sprays into each nostril daily       Objective     /84 (BP Location: Left arm, Patient Position: Sitting, Cuff Size: Large)   Pulse 66   Temp (!) 97.3 °F (36.3 °C) (Tympanic)   Ht 5' 11\" (1.803 m)   Wt 114 kg (251 lb)   SpO2 98%   BMI 35.01 kg/m²     Physical Exam  Vitals reviewed.   Constitutional:       Appearance: Normal appearance. He is obese. He is ill-appearing.   HENT:      Right Ear: Tympanic membrane normal.      Left Ear: Tympanic membrane normal.      Nose: Congestion present. No rhinorrhea.      Mouth/Throat:      Pharynx: No oropharyngeal exudate or posterior oropharyngeal erythema.   Cardiovascular:      Rate and Rhythm: Normal rate and regular rhythm.      Pulses: Normal pulses.      Heart sounds: Normal heart sounds.   Pulmonary:      Effort: Pulmonary effort is normal.      Breath sounds: Wheezing present.   Lymphadenopathy:      Cervical: No cervical adenopathy.   Neurological:      Mental Status: He is alert.   Psychiatric:         Mood and Affect: Mood normal.       Sparkle Jacobo MD    "

## 2024-04-23 ENCOUNTER — RA CDI HCC (OUTPATIENT)
Dept: OTHER | Facility: HOSPITAL | Age: 39
End: 2024-04-23

## 2024-04-23 PROBLEM — E66.01 CLASS 3 SEVERE OBESITY WITHOUT SERIOUS COMORBIDITY WITH BODY MASS INDEX (BMI) OF 45.0 TO 49.9 IN ADULT (HCC): Status: RESOLVED | Noted: 2020-12-21 | Resolved: 2024-04-23

## 2024-04-23 PROBLEM — E66.813 CLASS 3 SEVERE OBESITY WITHOUT SERIOUS COMORBIDITY WITH BODY MASS INDEX (BMI) OF 45.0 TO 49.9 IN ADULT (HCC): Status: RESOLVED | Noted: 2020-12-21 | Resolved: 2024-04-23

## 2024-05-07 ENCOUNTER — OFFICE VISIT (OUTPATIENT)
Dept: FAMILY MEDICINE CLINIC | Facility: CLINIC | Age: 39
End: 2024-05-07

## 2024-05-07 VITALS
DIASTOLIC BLOOD PRESSURE: 74 MMHG | WEIGHT: 257 LBS | HEIGHT: 71 IN | SYSTOLIC BLOOD PRESSURE: 120 MMHG | BODY MASS INDEX: 35.98 KG/M2 | HEART RATE: 72 BPM

## 2024-05-07 DIAGNOSIS — M51.37 DDD (DEGENERATIVE DISC DISEASE), LUMBOSACRAL: Primary | ICD-10-CM

## 2024-05-07 DIAGNOSIS — G57.11 NEUROPATHY OF RIGHT LATERAL FEMORAL CUTANEOUS NERVE: ICD-10-CM

## 2024-05-07 RX ORDER — CYCLOBENZAPRINE HCL 5 MG
5 TABLET ORAL 3 TIMES DAILY PRN
Qty: 30 TABLET | Refills: 2 | Status: SHIPPED | OUTPATIENT
Start: 2024-05-07

## 2024-05-07 NOTE — PROGRESS NOTES
"Name: Avelino Espinoza      : 1985      MRN: [id number]  Encounter Provider: Sparkle Jacobo MD  Encounter Date: 2024   Encounter department: [facility] PRIMARY CARE    Assessment & Plan     1. DDD (degenerative disc disease), lumbosacral  -     cyclobenzaprine (FLEXERIL) 5 mg tablet; Take 1 tablet (5 mg total) by mouth 3 (three) times a day as needed for muscle spasms    2. 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 check up  Unfortunately the claims have not been being paid because his employer switched WC providers he continues with the back pain from his injury 13 years ago but is not currently taking any medication or therapy,  just home exercises, no otc  meds, does have  muscle relaxers that he  uses  as needed, does  need  refill      Review of Systems   Constitutional:  Positive for unexpected weight change. Negative for activity change, appetite change and fatigue.        6 lb  gain, still down in weight   Respiratory:  Negative for shortness of breath.    Musculoskeletal:  Positive for back pain.   Psychiatric/Behavioral:  The patient is nervous/anxious.         Stress with wife losing  job       Current Outpatient Medications on File Prior to Visit   Medication Sig    albuterol (Ventolin HFA) 90 mcg/act inhaler Inhale 2 puffs every 6 (six) hours as needed for wheezing    fluticasone (FLONASE) 50 mcg/act nasal spray 2 sprays into each nostril daily       Objective     /74   Pulse 72   Ht 5' 11\" (1.803 m)   Wt 117 kg (257 lb)   BMI 35.84 kg/m²     Physical Exam  Vitals reviewed.   Constitutional:       Appearance: Normal appearance. He is obese.   Cardiovascular:      Rate and Rhythm: Normal rate and regular rhythm.      Pulses: Normal pulses.      Heart sounds: Normal " heart sounds.   Pulmonary:      Effort: Pulmonary effort is normal.      Breath sounds: Normal breath sounds.   Musculoskeletal:         General: Tenderness present.      Comments: Mild spasm of  lumbar spine   Lymphadenopathy:      Cervical: No cervical adenopathy.   Neurological:      Mental Status: He is alert.   Psychiatric:         Mood and Affect: Mood normal.       Sparkle Jacobo MD

## 2024-07-23 ENCOUNTER — TELEPHONE (OUTPATIENT)
Dept: FAMILY MEDICINE CLINIC | Facility: CLINIC | Age: 39
End: 2024-07-23

## 2024-07-23 NOTE — TELEPHONE ENCOUNTER
Called pt and left vm in regards of rescheduling appt for 10/07 as provider will be out of office, if pt calls back please reschedule as needed.     Thank you.   [place].

## 2024-12-06 ENCOUNTER — OFFICE VISIT (OUTPATIENT)
Dept: FAMILY MEDICINE CLINIC | Facility: CLINIC | Age: 39
End: 2024-12-06
Payer: COMMERCIAL

## 2024-12-06 VITALS
HEIGHT: 71 IN | WEIGHT: 272.8 LBS | HEART RATE: 77 BPM | DIASTOLIC BLOOD PRESSURE: 76 MMHG | SYSTOLIC BLOOD PRESSURE: 120 MMHG | OXYGEN SATURATION: 99 % | BODY MASS INDEX: 38.19 KG/M2

## 2024-12-06 DIAGNOSIS — M51.370 DEGENERATION OF INTERVERTEBRAL DISC OF LUMBOSACRAL REGION WITH DISCOGENIC BACK PAIN: Primary | ICD-10-CM

## 2024-12-06 DIAGNOSIS — L30.9 DERMATITIS: ICD-10-CM

## 2024-12-06 PROCEDURE — 99213 OFFICE O/P EST LOW 20 MIN: CPT | Performed by: FAMILY MEDICINE

## 2024-12-06 RX ORDER — BETAMETHASONE DIPROPIONATE 0.5 MG/G
CREAM TOPICAL 2 TIMES DAILY
Qty: 45 G | Refills: 1 | Status: SHIPPED | OUTPATIENT
Start: 2024-12-06

## 2024-12-06 NOTE — LETTER
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 Feel free to call me with any questions     Sincerely,        Sparkle Jacobo M.D

## 2024-12-06 NOTE — PROGRESS NOTES
"Name: Avelino Espinoza      : 1985      MRN: [id number]  Encounter Provider: Sparkle Jacobo MD  Encounter Date: 2024   Encounter department: [facility] PRIMARY CARE  :  Assessment & Plan  Dermatitis  Trial of  betamethasone    Orders:    betamethasone, augmented, (DIPROLENE-AF) 0.05 % cream; Apply topically 2 (two) times a day    Degeneration of intervertebral disc of lumbosacral region with discogenic back pain  Uses  flexeril prn, Tylenol or  Motrin                History of Present Illness     Patient presents with:  Follow-up: Follow up-needs work restriction updated. Also left foot has a sore spot-not  sure  if  it is  a callous, can't  afford to  see podiatry, gained  weight  due to stress of  wife  having some  medical issues         Review of Systems   Constitutional:  Positive for unexpected weight change. Negative for activity change, appetite change and fatigue.        15 lb gain   Respiratory:  Negative for shortness of breath.    Cardiovascular:  Negative for chest pain.   Musculoskeletal:  Positive for back pain.   Skin:  Positive for rash.        Callous l foot   Neurological:  Positive for numbness. Negative for dizziness, weakness, light-headedness and headaches.        Occ numbness when sits          Objective   /76 (BP Location: Right arm, Patient Position: Sitting, Cuff Size: Extra-Large)   Pulse 77   Ht 5' 11\" (1.803 m)   Wt 124 kg (272 lb 12.8 oz)   SpO2 99%   BMI 38.05 kg/m²      Physical Exam  Vitals reviewed.   Constitutional:       Appearance: Normal appearance. He is obese.   Cardiovascular:      Rate and Rhythm: Normal rate and regular rhythm.      Pulses: Normal pulses.      Heart sounds: Normal heart sounds.   Pulmonary:      Effort: Pulmonary effort is normal.      Breath sounds: Normal breath sounds.   Musculoskeletal:         General: Tenderness present.      Right lower leg: No edema.      Left lower leg: No edema.      Comments: Low  back pain "   Lymphadenopathy:      Cervical: No cervical adenopathy.   Neurological:      Mental Status: He is alert.   Psychiatric:         Mood and Affect: Mood normal.

## 2024-12-06 NOTE — ASSESSMENT & PLAN NOTE
Trial of  betamethasone    Orders:    betamethasone, augmented, (DIPROLENE-AF) 0.05 % cream; Apply topically 2 (two) times a day

## 2025-01-09 NOTE — PATIENT INSTRUCTIONS
1   Perirectal abscess -this seems to be healing after spontaneous drainage and antibiotic therapy with Bactrim  Patient is encouraged to finish the remaining 7 days of his Bactrim therapy  He could also use warm soaks with a washcloth in the area 2 or 3 times daily  He should keep a close eye on symptoms if they would worsen again he is to call us as soon as possible  Patient verbalizes understanding and agreement with plan  We did discuss the possibility if this came back of seeing a colorectal specialist   Patient does have a specialist he has seen previously for hemorrhoids and diverticulosis  [facility] were not available at the visit  No purulent drainage for culture at this time  Will await their records  Patient believes they did a culture at his visit  2  Elevated fasting glucose -we discussed his weight and family history of diabetes and possible risk  He is agreeable to going for testing and we will order CBC, CMP, lipids, hemoglobin A1c and TSH  3   Obesity -continue with diet and exercise efforts discussed 
POST-OP DIAGNOSIS:  Trigger finger, left middle finger 09-Jan-2025 08:07:54  Quita Marcum

## 2025-03-18 DIAGNOSIS — J98.01 BRONCHOSPASM: ICD-10-CM

## 2025-03-18 RX ORDER — ALBUTEROL SULFATE 90 UG/1
2 INHALANT RESPIRATORY (INHALATION) EVERY 6 HOURS PRN
Qty: 18 G | Refills: 1 | Status: SHIPPED | OUTPATIENT
Start: 2025-03-18

## 2025-03-18 NOTE — TELEPHONE ENCOUNTER
Pt called in stating [facility] do charge him $50 for 1 month supply on inhaler. Where as [facility] $50 for 3 months supply on inhaler. So for the inhaler he always want the script to be [facility] please do help. Thanks

## 2025-03-26 ENCOUNTER — OFFICE VISIT (OUTPATIENT)
Dept: FAMILY MEDICINE CLINIC | Facility: CLINIC | Age: 40
End: 2025-03-26
Payer: COMMERCIAL

## 2025-03-26 VITALS
HEIGHT: 71 IN | BODY MASS INDEX: 40.88 KG/M2 | DIASTOLIC BLOOD PRESSURE: 88 MMHG | HEART RATE: 47 BPM | WEIGHT: 292 LBS | SYSTOLIC BLOOD PRESSURE: 130 MMHG | OXYGEN SATURATION: 97 %

## 2025-03-26 DIAGNOSIS — K57.90 DIVERTICULOSIS: ICD-10-CM

## 2025-03-26 DIAGNOSIS — K64.8 INTERNAL HEMORRHOID: ICD-10-CM

## 2025-03-26 DIAGNOSIS — R19.5 ABNORMAL STOOL COLOR: ICD-10-CM

## 2025-03-26 DIAGNOSIS — R10.9 ABDOMINAL CRAMPING: Primary | ICD-10-CM

## 2025-03-26 PROCEDURE — 99214 OFFICE O/P EST MOD 30 MIN: CPT | Performed by: FAMILY MEDICINE

## 2025-03-26 RX ORDER — DICYCLOMINE HYDROCHLORIDE 10 MG/1
CAPSULE ORAL
Qty: 40 CAPSULE | Refills: 0 | Status: SHIPPED | OUTPATIENT
Start: 2025-03-26

## 2025-03-26 NOTE — PROGRESS NOTES
"Name: Avelino Espinoza      : 1985      MRN: [id number]  Encounter Provider: Cezar Taylor DO  Encounter Date: 3/26/2025   Encounter department: [facility] PR[facility]  Follow-up gastroenterology  :  Assessment & Plan  Abdominal cramping  Bentyl as ordered  Patient did have colonoscopy 2015, due for 10-year repeat.  Dr. Glass to do this colonoscopy fortunately she is out of the area will refer to gastroenterology  Orders:  •  dicyclomine (BENTYL) 10 mg capsule; Take 1 capsule every 6 hours as needed for abdominal cramping  •  Ambulatory Referral to Gastroenterology; Future    Abnormal stool color  Patient states his stool seems yellow.  Orders:  •  Ambulatory Referral to Gastroenterology; Future    Diverticulosis  History of diverticulitis status post colonoscopy, 11/13/15 with Dr. Glass  Orders:  •  Ambulatory Referral to Gastroenterology; Future    Internal hemorrhoid  Status post colonoscopy, 2015  Orders:  •  Ambulatory Referral to Gastroenterology; Future           History of Present Illness   Patient states on the ninth he went to a baby shower and the food did not \"sit well.  He had some abdominal cramping ever since.  Had some loose stools with some yellow stool.  Patient has not gotten worse but not gotten any better.  Patient denies any vomiting      Review of Systems   Constitutional:  Negative for chills and fever.   HENT: Negative.     Eyes: Negative.    Respiratory: Negative.     Cardiovascular: Negative.    Gastrointestinal:         HPI   Endocrine: Negative.    Genitourinary: Negative.    Musculoskeletal: Negative.    Skin: Negative.    Allergic/Immunologic: Negative.    Neurological: Negative.    Hematological: Negative.    Psychiatric/Behavioral: Negative.         Objective   /88 (BP Location: Right arm, Patient Position: Sitting, Cuff Size: Standard)   Pulse (!) 47   Ht 5' 11\" (1.803 m)   Wt 132 kg (292 lb)   SpO2 97%   BMI 40.73 kg/m²    "   Physical Exam  Vitals and nursing note reviewed.   Constitutional:       General: He is not in acute distress.     Appearance: Normal appearance. He is obese. He is not ill-appearing, toxic-appearing or diaphoretic.   HENT:      Head: Normocephalic and atraumatic.      Mouth/Throat:      Mouth: Mucous membranes are moist.   Eyes:      General: No scleral icterus.  Cardiovascular:      Rate and Rhythm: Normal rate and regular rhythm.      Heart sounds: Normal heart sounds.   Pulmonary:      Effort: Pulmonary effort is normal.      Breath sounds: Normal breath sounds.   Abdominal:      General: Bowel sounds are normal. There is no distension.      Palpations: Abdomen is soft. There is no mass.      Tenderness: There is no abdominal tenderness. There is no right CVA tenderness, left CVA tenderness, guarding or rebound.      Hernia: No hernia is present.   Genitourinary:     Rectum: Normal. Guaiac result negative.   Musculoskeletal:      Cervical back: No rigidity.   Skin:     General: Skin is warm and dry.   Neurological:      General: No focal deficit present.      Mental Status: He is alert.   Psychiatric:         Mood and Affect: Mood normal.

## 2025-03-26 NOTE — PATIENT INSTRUCTIONS
Use Bentyl/dicyclomine 10 mg every 6 hours as needed for abdominal cramping  Follow with gastroenterology  Return at scheduled appointment sooner if needed

## 2025-03-31 ENCOUNTER — ANESTHESIA EVENT (OUTPATIENT)
Dept: ANESTHESIOLOGY | Facility: HOSPITAL | Age: 40
End: 2025-03-31

## 2025-03-31 ENCOUNTER — ANESTHESIA (OUTPATIENT)
Dept: ANESTHESIOLOGY | Facility: HOSPITAL | Age: 40
End: 2025-03-31

## 2025-03-31 ENCOUNTER — TELEPHONE (OUTPATIENT)
Dept: GASTROENTEROLOGY | Facility: MEDICAL CENTER | Age: 40
End: 2025-03-31

## 2025-03-31 ENCOUNTER — OFFICE VISIT (OUTPATIENT)
Dept: GASTROENTEROLOGY | Facility: MEDICAL CENTER | Age: 40
End: 2025-03-31
Payer: COMMERCIAL

## 2025-03-31 VITALS
HEIGHT: 71 IN | OXYGEN SATURATION: 98 % | SYSTOLIC BLOOD PRESSURE: 137 MMHG | WEIGHT: 292 LBS | DIASTOLIC BLOOD PRESSURE: 96 MMHG | BODY MASS INDEX: 40.88 KG/M2 | TEMPERATURE: 98.2 F | HEART RATE: 64 BPM

## 2025-03-31 DIAGNOSIS — K64.8 INTERNAL HEMORRHOID: ICD-10-CM

## 2025-03-31 DIAGNOSIS — K21.9 GASTROESOPHAGEAL REFLUX DISEASE WITHOUT ESOPHAGITIS: ICD-10-CM

## 2025-03-31 DIAGNOSIS — K62.5 RECTAL BLEED: Primary | ICD-10-CM

## 2025-03-31 DIAGNOSIS — R19.5 ABNORMAL STOOL COLOR: ICD-10-CM

## 2025-03-31 DIAGNOSIS — K57.90 DIVERTICULOSIS: ICD-10-CM

## 2025-03-31 DIAGNOSIS — R10.9 ABDOMINAL CRAMPING: ICD-10-CM

## 2025-03-31 PROCEDURE — 99204 OFFICE O/P NEW MOD 45 MIN: CPT | Performed by: NURSE PRACTITIONER

## 2025-03-31 RX ORDER — SODIUM CHLORIDE, SODIUM LACTATE, POTASSIUM CHLORIDE, CALCIUM CHLORIDE 600; 310; 30; 20 MG/100ML; MG/100ML; MG/100ML; MG/100ML
125 INJECTION, SOLUTION INTRAVENOUS CONTINUOUS
Status: CANCELLED | OUTPATIENT
Start: 2025-03-31

## 2025-03-31 RX ORDER — FAMOTIDINE 40 MG/1
40 TABLET, FILM COATED ORAL 2 TIMES DAILY
Qty: 60 TABLET | Refills: 3 | Status: SHIPPED | OUTPATIENT
Start: 2025-03-31

## 2025-03-31 NOTE — TELEPHONE ENCOUNTER
Scheduled date of Colonoscopy/EGD (as of today) April 11  Physician performing: Dr. Laboy  Location of procedure:  [facility]  Instructions given to patient: Clenpiq  Clearances: None  Diabetic: No

## 2025-03-31 NOTE — PROGRESS NOTES
Name: Avelino Espinoza      : 1985      MRN: [id number]  Encounter Provider: JIHAN Ricardo  Encounter Date: 3/31/2025   Encounter department: [facility]  :  Assessment & Plan  Rectal bleed  Intermittent bouts of bright red blood on wipe.  Occasionally has to strain to have a BM.  BMs are usually brown and formed daily.  Last colonoscopy was  which noted diverticulosis and internal hemorrhoids.  Will rule out colon polyps and neoplasm.  Orders:    Colonoscopy; Future    sodium picosulfate, magnesium oxide, citric acid (Clenpiq) oral solution; Take 175 mL (1 bottle) the evening before the colonoscopy, between 5 PM and 9 PM, followed by a second 175 mL bottle 5 hours before the colonoscopy.    CBC (Includes Diff/Plt) (Refl); Future    Abdominal cramping  Intermittent bouts of lower abdominal cramping that is better at times with a BM.  Little help with Bentyl/dicyclomine.  Reports he is had this on and off for many years.  Orders:    Ambulatory Referral to Gastroenterology    Colonoscopy; Future    sodium picosulfate, magnesium oxide, citric acid (Clenpiq) oral solution; Take 175 mL (1 bottle) the evening before the colonoscopy, between 5 PM and 9 PM, followed by a second 175 mL bottle 5 hours before the colonoscopy.    CBC (Includes Diff/Plt) (Refl); Future    Comprehensive metabolic panel; Future    TSH, 3rd generation; Future    Celiac Panel/Adult; Future    Diverticulosis  History of diverticulosis with last bout of diverticulitis in .  Orders:    Ambulatory Referral to Gastroenterology    Colonoscopy; Future    sodium picosulfate, magnesium oxide, citric acid (Clenpiq) oral solution; Take 175 mL (1 bottle) the evening before the colonoscopy, between 5 PM and 9 PM, followed by a second 175 mL bottle 5 hours before the colonoscopy.    Gastroesophageal reflux disease without esophagitis  Longstanding history of acid reflux.  Has used OTC an acids  with little to no help.  Reports he was on a PPI in the past and developed joint pain so he stopped it.  Does drink 3 to 4 cups of caffeine per day.  Never had an EGD.  Denies any nausea, vomiting or dysphagia.  Will rule out PUD, gastritis/esophagitis, celiac and H. pylori.  Orders:    EGD; Future    famotidine (PEPCID) 40 MG tablet; Take 1 tablet (40 mg total) by mouth 2 (two) times a day  -Follow-up in office after testing  -Reduce caffeine use.  Follow antireflux diet    I reviewed with patient/family potential risks of endoscopic evaluation including possible infection, bleeding or perforation.  Patient/family verbalized understanding of potential risks and agreed to procedure(s).      History of Present Illness   Avelino Espinoza is a 39 y.o. male who presents with chronic heartburn/reflux, rectal bleeding and abdominal cramping.  He has past medical history of GERD, DDD and neuropathy.  HPI    History of chronic heartburn/reflux for many years.  Never had an EGD.  He does drink 3 to 4 cups of caffeine per day.  No NSAID use.  No alcohol use.  Occasional spicy food use.  Reports regurgitation that can occur anytime of the day or night.  Has tried multiple OTC antacids with little help.  Reports he was on a PPI in the past but stopped it due to joint pain.  Denies any nausea, vomiting or dysphagia.    History of hemorrhoids in the past as well as diverticulosis.  Last colonoscopy was 2015 which noted no polyps.  No family history of any colon cancers or polyps.  Reporting intermittent bouts of rectal bleeding.  Occasional bright red blood on wipe.  No itching, burning or pain in rectum.  Reports some anal seepage of a clear liquid at times.  Also reporting intermittent bouts of lower abdominal cramping that can be worse after eating and occasionally better after BM.  BMs are usually brown and formed daily.  Occasionally has to strain to have a BM.  BMs are [place] stool scale 2-4.  No recent weight loss.    No  recent abdominal imaging to review.  No recent labs to review.      Prior EGD/colonoscopy    Colonoscopy 11/15-diverticulosis and hemorrhoids.  Repeat colonoscopy recommended in 10 years per Dr. Glass.    Never had an EGD      History obtained from: patient  Review of Systems   Constitutional:  Negative for chills and fever.   HENT:  Negative for ear pain and sore throat.    Eyes:  Negative for pain and visual disturbance.   Respiratory:  Negative for cough and shortness of breath.    Cardiovascular:  Negative for chest pain and palpitations.   Gastrointestinal:  Positive for abdominal pain, anal bleeding and constipation. Negative for vomiting.   Genitourinary:  Negative for dysuria and hematuria.   Musculoskeletal:  Negative for arthralgias and back pain.   Skin:  Negative for color change and rash.   Neurological:  Negative for seizures and syncope.   All other systems reviewed and are negative.   A complete review of systems is negative other than that noted above in the HPI.    Medical History Reviewed by provider this encounter:  Tobacco  Allergies  Meds  Problems  Med Hx  Surg Hx  Fam Hx     .  Current Outpatient Medications on File Prior to Visit   Medication Sig Dispense Refill    albuterol (Ventolin HFA) 90 mcg/act inhaler Inhale 2 puffs every 6 (six) hours as needed for wheezing 18 g 1    betamethasone, augmented, (DIPROLENE-AF) 0.05 % cream Apply topically 2 (two) times a day 45 g 1    cyclobenzaprine (FLEXERIL) 5 mg tablet Take 1 tablet (5 mg total) by mouth 3 (three) times a day as needed for muscle spasms 30 tablet 2    dicyclomine (BENTYL) 10 mg capsule Take 1 capsule every 6 hours as needed for abdominal cramping 40 capsule 0    fluticasone (FLONASE) 50 mcg/act nasal spray 2 sprays into each nostril daily 15.8 mL 3     No current facility-administered medications on file prior to visit.      Social History     Tobacco Use    Smoking status: Never    Smokeless tobacco: Never   Vaping Use     "Vaping status: Never Used   Substance and Sexual Activity    Alcohol use: Not Currently     Comment: Have a drink once or twice a year.    Drug use: No    Sexual activity: Yes     Partners: Female     Birth control/protection: Condom Male        Objective   /96 (BP Location: Left arm)   Pulse 64   Temp 98.2 °F (36.8 °C)   Ht 5' 11\" (1.803 m)   Wt 132 kg (292 lb)   SpO2 98%   BMI 40.73 kg/m²     Physical Exam  Vitals and nursing note reviewed.   Constitutional:       General: He is not in acute distress.     Appearance: He is well-developed.   HENT:      Head: Normocephalic and atraumatic.   Eyes:      Conjunctiva/sclera: Conjunctivae normal.   Cardiovascular:      Rate and Rhythm: Normal rate and regular rhythm.      Heart sounds: No murmur heard.  Pulmonary:      Effort: Pulmonary effort is normal. No respiratory distress.      Breath sounds: Normal breath sounds.   Abdominal:      General: Bowel sounds are normal.      Palpations: Abdomen is soft.      Tenderness: There is no abdominal tenderness.   Musculoskeletal:         General: No swelling.      Cervical back: Neck supple.   Skin:     General: Skin is warm and dry.      Capillary Refill: Capillary refill takes less than 2 seconds.   Neurological:      Mental Status: He is alert and oriented to person, place, and time.   Psychiatric:         Mood and Affect: Mood normal.            Lab Results: I personally reviewed relevant lab results.             "

## 2025-03-31 NOTE — ASSESSMENT & PLAN NOTE
Longstanding history of acid reflux.  Has used OTC an acids with little to no help.  Reports he was on a PPI in the past and developed joint pain so he stopped it.  Does drink 3 to 4 cups of caffeine per day.  Never had an EGD.  Denies any nausea, vomiting or dysphagia.  Will rule out PUD, gastritis/esophagitis, celiac and H. pylori.  Orders:    EGD; Future    famotidine (PEPCID) 40 MG tablet; Take 1 tablet (40 mg total) by mouth 2 (two) times a day  -Follow-up in office after testing  -Reduce caffeine use.  Follow antireflux diet    I reviewed with patient/family potential risks of endoscopic evaluation including possible infection, bleeding or perforation.  Patient/family verbalized understanding of potential risks and agreed to procedure(s).

## 2025-03-31 NOTE — H&P (VIEW-ONLY)
Name: Avelino Espinoza      : 1985      MRN: [id number]  Encounter Provider: JIHAN Ricardo  Encounter Date: 3/31/2025   Encounter department: [facility]  :  Assessment & Plan  Rectal bleed  Intermittent bouts of bright red blood on wipe.  Occasionally has to strain to have a BM.  BMs are usually brown and formed daily.  Last colonoscopy was  which noted diverticulosis and internal hemorrhoids.  Will rule out colon polyps and neoplasm.  Orders:    Colonoscopy; Future    sodium picosulfate, magnesium oxide, citric acid (Clenpiq) oral solution; Take 175 mL (1 bottle) the evening before the colonoscopy, between 5 PM and 9 PM, followed by a second 175 mL bottle 5 hours before the colonoscopy.    CBC (Includes Diff/Plt) (Refl); Future    Abdominal cramping  Intermittent bouts of lower abdominal cramping that is better at times with a BM.  Little help with Bentyl/dicyclomine.  Reports he is had this on and off for many years.  Orders:    Ambulatory Referral to Gastroenterology    Colonoscopy; Future    sodium picosulfate, magnesium oxide, citric acid (Clenpiq) oral solution; Take 175 mL (1 bottle) the evening before the colonoscopy, between 5 PM and 9 PM, followed by a second 175 mL bottle 5 hours before the colonoscopy.    CBC (Includes Diff/Plt) (Refl); Future    Comprehensive metabolic panel; Future    TSH, 3rd generation; Future    Celiac Panel/Adult; Future    Diverticulosis  History of diverticulosis with last bout of diverticulitis in .  Orders:    Ambulatory Referral to Gastroenterology    Colonoscopy; Future    sodium picosulfate, magnesium oxide, citric acid (Clenpiq) oral solution; Take 175 mL (1 bottle) the evening before the colonoscopy, between 5 PM and 9 PM, followed by a second 175 mL bottle 5 hours before the colonoscopy.    Gastroesophageal reflux disease without esophagitis  Longstanding history of acid reflux.  Has used OTC an acids  with little to no help.  Reports he was on a PPI in the past and developed joint pain so he stopped it.  Does drink 3 to 4 cups of caffeine per day.  Never had an EGD.  Denies any nausea, vomiting or dysphagia.  Will rule out PUD, gastritis/esophagitis, celiac and H. pylori.  Orders:    EGD; Future    famotidine (PEPCID) 40 MG tablet; Take 1 tablet (40 mg total) by mouth 2 (two) times a day  -Follow-up in office after testing  -Reduce caffeine use.  Follow antireflux diet    I reviewed with patient/family potential risks of endoscopic evaluation including possible infection, bleeding or perforation.  Patient/family verbalized understanding of potential risks and agreed to procedure(s).      History of Present Illness   Avelino Espinoza is a 39 y.o. male who presents with chronic heartburn/reflux, rectal bleeding and abdominal cramping.  He has past medical history of GERD, DDD and neuropathy.  HPI    History of chronic heartburn/reflux for many years.  Never had an EGD.  He does drink 3 to 4 cups of caffeine per day.  No NSAID use.  No alcohol use.  Occasional spicy food use.  Reports regurgitation that can occur anytime of the day or night.  Has tried multiple OTC antacids with little help.  Reports he was on a PPI in the past but stopped it due to joint pain.  Denies any nausea, vomiting or dysphagia.    History of hemorrhoids in the past as well as diverticulosis.  Last colonoscopy was 2015 which noted no polyps.  No family history of any colon cancers or polyps.  Reporting intermittent bouts of rectal bleeding.  Occasional bright red blood on wipe.  No itching, burning or pain in rectum.  Reports some anal seepage of a clear liquid at times.  Also reporting intermittent bouts of lower abdominal cramping that can be worse after eating and occasionally better after BM.  BMs are usually brown and formed daily.  Occasionally has to strain to have a BM.  BMs are [place] stool scale 2-4.  No recent weight loss.    No  recent abdominal imaging to review.  No recent labs to review.      Prior EGD/colonoscopy    Colonoscopy 11/15-diverticulosis and hemorrhoids.  Repeat colonoscopy recommended in 10 years per Dr. Glass.    Never had an EGD      History obtained from: patient  Review of Systems   Constitutional:  Negative for chills and fever.   HENT:  Negative for ear pain and sore throat.    Eyes:  Negative for pain and visual disturbance.   Respiratory:  Negative for cough and shortness of breath.    Cardiovascular:  Negative for chest pain and palpitations.   Gastrointestinal:  Positive for abdominal pain, anal bleeding and constipation. Negative for vomiting.   Genitourinary:  Negative for dysuria and hematuria.   Musculoskeletal:  Negative for arthralgias and back pain.   Skin:  Negative for color change and rash.   Neurological:  Negative for seizures and syncope.   All other systems reviewed and are negative.   A complete review of systems is negative other than that noted above in the HPI.    Medical History Reviewed by provider this encounter:  Tobacco  Allergies  Meds  Problems  Med Hx  Surg Hx  Fam Hx     .  Current Outpatient Medications on File Prior to Visit   Medication Sig Dispense Refill    albuterol (Ventolin HFA) 90 mcg/act inhaler Inhale 2 puffs every 6 (six) hours as needed for wheezing 18 g 1    betamethasone, augmented, (DIPROLENE-AF) 0.05 % cream Apply topically 2 (two) times a day 45 g 1    cyclobenzaprine (FLEXERIL) 5 mg tablet Take 1 tablet (5 mg total) by mouth 3 (three) times a day as needed for muscle spasms 30 tablet 2    dicyclomine (BENTYL) 10 mg capsule Take 1 capsule every 6 hours as needed for abdominal cramping 40 capsule 0    fluticasone (FLONASE) 50 mcg/act nasal spray 2 sprays into each nostril daily 15.8 mL 3     No current facility-administered medications on file prior to visit.      Social History     Tobacco Use    Smoking status: Never    Smokeless tobacco: Never   Vaping Use     "Vaping status: Never Used   Substance and Sexual Activity    Alcohol use: Not Currently     Comment: Have a drink once or twice a year.    Drug use: No    Sexual activity: Yes     Partners: Female     Birth control/protection: Condom Male        Objective   /96 (BP Location: Left arm)   Pulse 64   Temp 98.2 °F (36.8 °C)   Ht 5' 11\" (1.803 m)   Wt 132 kg (292 lb)   SpO2 98%   BMI 40.73 kg/m²     Physical Exam  Vitals and nursing note reviewed.   Constitutional:       General: He is not in acute distress.     Appearance: He is well-developed.   HENT:      Head: Normocephalic and atraumatic.   Eyes:      Conjunctiva/sclera: Conjunctivae normal.   Cardiovascular:      Rate and Rhythm: Normal rate and regular rhythm.      Heart sounds: No murmur heard.  Pulmonary:      Effort: Pulmonary effort is normal. No respiratory distress.      Breath sounds: Normal breath sounds.   Abdominal:      General: Bowel sounds are normal.      Palpations: Abdomen is soft.      Tenderness: There is no abdominal tenderness.   Musculoskeletal:         General: No swelling.      Cervical back: Neck supple.   Skin:     General: Skin is warm and dry.      Capillary Refill: Capillary refill takes less than 2 seconds.   Neurological:      Mental Status: He is alert and oriented to person, place, and time.   Psychiatric:         Mood and Affect: Mood normal.            Lab Results: I personally reviewed relevant lab results.             "

## 2025-04-01 ENCOUNTER — APPOINTMENT (OUTPATIENT)
Dept: LAB | Facility: HOSPITAL | Age: 40
End: 2025-04-01
Payer: COMMERCIAL

## 2025-04-01 DIAGNOSIS — R10.9 ABDOMINAL CRAMPING: ICD-10-CM

## 2025-04-01 DIAGNOSIS — K62.5 RECTAL BLEED: ICD-10-CM

## 2025-04-01 LAB
ALBUMIN SERPL BCG-MCNC: 5 G/DL (ref 3.5–5)
ALP SERPL-CCNC: 52 U/L (ref 34–104)
ALT SERPL W P-5'-P-CCNC: 31 U/L (ref 7–52)
ANION GAP SERPL CALCULATED.3IONS-SCNC: 8 MMOL/L (ref 4–13)
AST SERPL W P-5'-P-CCNC: 26 U/L (ref 13–39)
BASOPHILS # BLD AUTO: 0.05 THOUSANDS/ÂΜL (ref 0–0.1)
BASOPHILS NFR BLD AUTO: 1 % (ref 0–1)
BILIRUB SERPL-MCNC: 0.94 MG/DL (ref 0.2–1)
BUN SERPL-MCNC: 12 MG/DL (ref 5–25)
CALCIUM SERPL-MCNC: 10 MG/DL (ref 8.4–10.2)
CHLORIDE SERPL-SCNC: 100 MMOL/L (ref 96–108)
CO2 SERPL-SCNC: 31 MMOL/L (ref 21–32)
CREAT SERPL-MCNC: 0.68 MG/DL (ref 0.6–1.3)
EOSINOPHIL # BLD AUTO: 0.07 THOUSAND/ÂΜL (ref 0–0.61)
EOSINOPHIL NFR BLD AUTO: 1 % (ref 0–6)
ERYTHROCYTE [DISTWIDTH] IN BLOOD BY AUTOMATED COUNT: 12.5 % (ref 11.6–15.1)
GFR SERPL CREATININE-BSD FRML MDRD: 119 ML/MIN/1.73SQ M
GLUCOSE P FAST SERPL-MCNC: 85 MG/DL (ref 65–99)
HCT VFR BLD AUTO: 52 % (ref 36.5–49.3)
HGB BLD-MCNC: 17.6 G/DL (ref 12–17)
IGA SERPL-MCNC: 139 MG/DL (ref 66–433)
IMM GRANULOCYTES # BLD AUTO: 0.03 THOUSAND/UL (ref 0–0.2)
IMM GRANULOCYTES NFR BLD AUTO: 0 % (ref 0–2)
LYMPHOCYTES # BLD AUTO: 2.45 THOUSANDS/ÂΜL (ref 0.6–4.47)
LYMPHOCYTES NFR BLD AUTO: 36 % (ref 14–44)
MCH RBC QN AUTO: 28.9 PG (ref 26.8–34.3)
MCHC RBC AUTO-ENTMCNC: 33.8 G/DL (ref 31.4–37.4)
MCV RBC AUTO: 85 FL (ref 82–98)
MONOCYTES # BLD AUTO: 0.49 THOUSAND/ÂΜL (ref 0.17–1.22)
MONOCYTES NFR BLD AUTO: 7 % (ref 4–12)
NEUTROPHILS # BLD AUTO: 3.75 THOUSANDS/ÂΜL (ref 1.85–7.62)
NEUTS SEG NFR BLD AUTO: 55 % (ref 43–75)
NRBC BLD AUTO-RTO: 0 /100 WBCS
PLATELET # BLD AUTO: 210 THOUSANDS/UL (ref 149–390)
PMV BLD AUTO: 9.4 FL (ref 8.9–12.7)
POTASSIUM SERPL-SCNC: 3.9 MMOL/L (ref 3.5–5.3)
PROT SERPL-MCNC: 7.8 G/DL (ref 6.4–8.4)
RBC # BLD AUTO: 6.1 MILLION/UL (ref 3.88–5.62)
SODIUM SERPL-SCNC: 139 MMOL/L (ref 135–147)
TSH SERPL DL<=0.05 MIU/L-ACNC: 2.29 UIU/ML (ref 0.45–4.5)
WBC # BLD AUTO: 6.84 THOUSAND/UL (ref 4.31–10.16)

## 2025-04-01 PROCEDURE — 84443 ASSAY THYROID STIM HORMONE: CPT

## 2025-04-01 PROCEDURE — 86364 TISS TRNSGLTMNASE EA IG CLAS: CPT

## 2025-04-01 PROCEDURE — 82784 ASSAY IGA/IGD/IGG/IGM EACH: CPT

## 2025-04-01 PROCEDURE — 80053 COMPREHEN METABOLIC PANEL: CPT

## 2025-04-01 PROCEDURE — 36415 COLL VENOUS BLD VENIPUNCTURE: CPT

## 2025-04-01 PROCEDURE — 85025 COMPLETE CBC W/AUTO DIFF WBC: CPT

## 2025-04-02 ENCOUNTER — TELEPHONE (OUTPATIENT)
Dept: GASTROENTEROLOGY | Facility: MEDICAL CENTER | Age: 40
End: 2025-04-02

## 2025-04-04 LAB — TTG IGA SER IA-ACNC: <0.4 U/ML (ref ?–10)

## 2025-04-07 ENCOUNTER — TELEPHONE (OUTPATIENT)
Age: 40
End: 2025-04-07

## 2025-04-07 NOTE — TELEPHONE ENCOUNTER
Pt calling to ask if he should stop taking Dicyclomine prior to colonoscopy.  States he just started taking it this past weekend due to abd cramps/diarrhea.  Advised safe to take prior to colonoscopy.

## 2025-04-11 ENCOUNTER — HOSPITAL ENCOUNTER (OUTPATIENT)
Dept: GASTROENTEROLOGY | Facility: MEDICAL CENTER | Age: 40
Setting detail: OUTPATIENT SURGERY
End: 2025-04-11
Payer: COMMERCIAL

## 2025-04-11 ENCOUNTER — ANESTHESIA EVENT (OUTPATIENT)
Dept: GASTROENTEROLOGY | Facility: MEDICAL CENTER | Age: 40
End: 2025-04-11
Payer: COMMERCIAL

## 2025-04-11 ENCOUNTER — ANESTHESIA (OUTPATIENT)
Dept: GASTROENTEROLOGY | Facility: MEDICAL CENTER | Age: 40
End: 2025-04-11
Payer: COMMERCIAL

## 2025-04-11 VITALS
HEART RATE: 59 BPM | RESPIRATION RATE: 18 BRPM | BODY MASS INDEX: 40.88 KG/M2 | SYSTOLIC BLOOD PRESSURE: 137 MMHG | WEIGHT: 292 LBS | OXYGEN SATURATION: 98 % | HEIGHT: 71 IN | DIASTOLIC BLOOD PRESSURE: 92 MMHG

## 2025-04-11 DIAGNOSIS — K62.5 RECTAL BLEED: ICD-10-CM

## 2025-04-11 DIAGNOSIS — K21.9 GASTROESOPHAGEAL REFLUX DISEASE WITHOUT ESOPHAGITIS: ICD-10-CM

## 2025-04-11 DIAGNOSIS — K57.90 DIVERTICULOSIS: ICD-10-CM

## 2025-04-11 DIAGNOSIS — K64.8 INTERNAL HEMORRHOID: ICD-10-CM

## 2025-04-11 DIAGNOSIS — R10.9 ABDOMINAL CRAMPING: ICD-10-CM

## 2025-04-11 PROCEDURE — 43239 EGD BIOPSY SINGLE/MULTIPLE: CPT | Performed by: INTERNAL MEDICINE

## 2025-04-11 PROCEDURE — 88342 IMHCHEM/IMCYTCHM 1ST ANTB: CPT | Performed by: PATHOLOGY

## 2025-04-11 PROCEDURE — 45385 COLONOSCOPY W/LESION REMOVAL: CPT | Performed by: INTERNAL MEDICINE

## 2025-04-11 PROCEDURE — 45380 COLONOSCOPY AND BIOPSY: CPT | Performed by: INTERNAL MEDICINE

## 2025-04-11 PROCEDURE — 88305 TISSUE EXAM BY PATHOLOGIST: CPT | Performed by: PATHOLOGY

## 2025-04-11 RX ORDER — ONDANSETRON 2 MG/ML
4 INJECTION INTRAMUSCULAR; INTRAVENOUS ONCE AS NEEDED
Status: CANCELLED | OUTPATIENT
Start: 2025-04-11

## 2025-04-11 RX ORDER — PROPOFOL 10 MG/ML
INJECTION, EMULSION INTRAVENOUS AS NEEDED
Status: DISCONTINUED | OUTPATIENT
Start: 2025-04-11 | End: 2025-04-11

## 2025-04-11 RX ORDER — SODIUM CHLORIDE, SODIUM LACTATE, POTASSIUM CHLORIDE, CALCIUM CHLORIDE 600; 310; 30; 20 MG/100ML; MG/100ML; MG/100ML; MG/100ML
125 INJECTION, SOLUTION INTRAVENOUS CONTINUOUS
Status: DISCONTINUED | OUTPATIENT
Start: 2025-04-11 | End: 2025-04-15 | Stop reason: HOSPADM

## 2025-04-11 RX ORDER — PROPOFOL 10 MG/ML
INJECTION, EMULSION INTRAVENOUS CONTINUOUS PRN
Status: DISCONTINUED | OUTPATIENT
Start: 2025-04-11 | End: 2025-04-11

## 2025-04-11 RX ORDER — SODIUM CHLORIDE, SODIUM LACTATE, POTASSIUM CHLORIDE, CALCIUM CHLORIDE 600; 310; 30; 20 MG/100ML; MG/100ML; MG/100ML; MG/100ML
INJECTION, SOLUTION INTRAVENOUS CONTINUOUS PRN
Status: DISCONTINUED | OUTPATIENT
Start: 2025-04-11 | End: 2025-04-11

## 2025-04-11 RX ADMIN — PROPOFOL 50 MG: 10 INJECTION, EMULSION INTRAVENOUS at 13:47

## 2025-04-11 RX ADMIN — SODIUM CHLORIDE, SODIUM LACTATE, POTASSIUM CHLORIDE, AND CALCIUM CHLORIDE: .6; .31; .03; .02 INJECTION, SOLUTION INTRAVENOUS at 13:23

## 2025-04-11 RX ADMIN — PROPOFOL 50 MG: 10 INJECTION, EMULSION INTRAVENOUS at 13:34

## 2025-04-11 RX ADMIN — SODIUM CHLORIDE, SODIUM LACTATE, POTASSIUM CHLORIDE, AND CALCIUM CHLORIDE 125 ML/HR: .6; .31; .03; .02 INJECTION, SOLUTION INTRAVENOUS at 13:13

## 2025-04-11 RX ADMIN — PROPOFOL 120 MCG/KG/MIN: 10 INJECTION, EMULSION INTRAVENOUS at 13:45

## 2025-04-11 RX ADMIN — PROPOFOL 100 MG: 10 INJECTION, EMULSION INTRAVENOUS at 13:32

## 2025-04-11 RX ADMIN — PROPOFOL 50 MG: 10 INJECTION, EMULSION INTRAVENOUS at 13:37

## 2025-04-11 RX ADMIN — PROPOFOL 100 MG: 10 INJECTION, EMULSION INTRAVENOUS at 13:31

## 2025-04-11 RX ADMIN — PROPOFOL 50 MG: 10 INJECTION, EMULSION INTRAVENOUS at 13:43

## 2025-04-11 RX ADMIN — PROPOFOL 50 MG: 10 INJECTION, EMULSION INTRAVENOUS at 13:39

## 2025-04-11 NOTE — ANESTHESIA PREPROCEDURE EVALUATION
Procedure:  COLONOSCOPY  EGD    Relevant Problems   GI/HEPATIC   (+) GERD (gastroesophageal reflux disease)        Physical Exam    Airway    Mallampati score: III  TM Distance: >3 FB  Neck ROM: full     Dental   No notable dental hx     Cardiovascular  Cardiovascular exam normal    Pulmonary  Pulmonary exam normal     Other Findings        Anesthesia Plan  ASA Score- 3     Anesthesia Type- IV sedation with anesthesia with ASA Monitors.         Additional Monitors:     Airway Plan:            Plan Factors-Exercise tolerance (METS): >4 METS.    Chart reviewed.   Existing labs reviewed. Patient summary reviewed.    Patient is not a current smoker.              Induction-     Postoperative Plan-     Perioperative Resuscitation Plan - Level 1 - Full Code.       Informed Consent- Anesthetic plan and risks discussed with patient.  I personally reviewed this patient with the CRNA. Discussed and agreed on the Anesthesia Plan with the CRNA..      NPO Status:  Vitals Value Taken Time   Date of last liquid 04/11/25 04/11/25 1306   Time of last liquid 0830 04/11/25 1306   Date of last solid 04/09/25 04/11/25 1306   Time of last solid 1830 04/11/25 1306

## 2025-04-11 NOTE — ANESTHESIA POSTPROCEDURE EVALUATION
Post-Op Assessment Note    CV Status:  Stable  Pain Score: 0    Pain management: adequate       Mental Status:  Sleepy   Hydration Status:  Stable   PONV Controlled:  None   Airway Patency:  Patent  There is a medical reason for not screening for obstructive sleep apnea and/or for not using two or more mitigation strategies   Post Op Vitals Reviewed: Yes    No anethesia notable event occurred.    Staff: CRNA           Last Filed PACU Vitals:  Vitals Value Taken Time   Temp     Pulse 63    /78    Resp 16    SpO2 97

## 2025-04-11 NOTE — ANESTHESIA POSTPROCEDURE EVALUATION
Post-Op Assessment Note    CV Status:  Stable  Pain Score: 0    Pain management: adequate       Mental Status:  Sleepy   Hydration Status:  Stable   PONV Controlled:  None   Airway Patency:  Patent  There is a medical reason for not screening for obstructive sleep apnea and/or for not using two or more mitigation strategies   Post Op Vitals Reviewed: Yes    No anethesia notable event occurred.    Staff: CRNA, Anesthesiologist           Last Filed PACU Vitals:  Vitals Value Taken Time   Temp     Pulse 63    /78    Resp 16    SpO2 97

## 2025-04-16 ENCOUNTER — RESULTS FOLLOW-UP (OUTPATIENT)
Age: 40
End: 2025-04-16

## 2025-04-16 PROCEDURE — 88342 IMHCHEM/IMCYTCHM 1ST ANTB: CPT | Performed by: PATHOLOGY

## 2025-04-16 PROCEDURE — 88305 TISSUE EXAM BY PATHOLOGIST: CPT | Performed by: PATHOLOGY

## 2025-05-16 ENCOUNTER — OFFICE VISIT (OUTPATIENT)
Dept: FAMILY MEDICINE CLINIC | Facility: CLINIC | Age: 40
End: 2025-05-16
Payer: COMMERCIAL

## 2025-05-16 ENCOUNTER — NURSE TRIAGE (OUTPATIENT)
Age: 40
End: 2025-05-16

## 2025-05-16 VITALS
BODY MASS INDEX: 41.3 KG/M2 | HEART RATE: 88 BPM | SYSTOLIC BLOOD PRESSURE: 110 MMHG | OXYGEN SATURATION: 97 % | HEIGHT: 71 IN | WEIGHT: 295 LBS | DIASTOLIC BLOOD PRESSURE: 80 MMHG

## 2025-05-16 DIAGNOSIS — D22.9 ATYPICAL MOLE: ICD-10-CM

## 2025-05-16 DIAGNOSIS — H00.011 HORDEOLUM EXTERNUM OF RIGHT UPPER EYELID: Primary | ICD-10-CM

## 2025-05-16 DIAGNOSIS — J01.00 ACUTE NON-RECURRENT MAXILLARY SINUSITIS: ICD-10-CM

## 2025-05-16 DIAGNOSIS — J98.01 BRONCHOSPASM: ICD-10-CM

## 2025-05-16 PROCEDURE — 99214 OFFICE O/P EST MOD 30 MIN: CPT | Performed by: FAMILY MEDICINE

## 2025-05-16 RX ORDER — ALBUTEROL SULFATE 90 UG/1
2 INHALANT RESPIRATORY (INHALATION) EVERY 6 HOURS PRN
Qty: 54 G | Refills: 1 | Status: SHIPPED | OUTPATIENT
Start: 2025-05-16

## 2025-05-16 NOTE — PATIENT INSTRUCTIONS
Get copay  coupon  from [employer]  for  Ventolin, rx  for  Augmentin, derm referral for  moles

## 2025-05-16 NOTE — PROGRESS NOTES
"Name: Avelino Espinoza      : 1985      MRN: [id number]  Encounter Provider: Sparkle Jacobo MD  Encounter Date: 2025   Encounter department: [facility]  :  Assessment & Plan  Hordeolum externum of right upper eyelid  Warm compresses, augmentin  Orders:    amoxicillin-clavulanate (AUGMENTIN) 875-125 mg per tablet; Take 1 tablet by mouth every 12 (twelve) hours for 10 days    Acute non-recurrent maxillary sinusitis    Orders:    amoxicillin-clavulanate (AUGMENTIN) 875-125 mg per tablet; Take 1 tablet by mouth every 12 (twelve) hours for 10 days    Atypical mole  Await derm referral  Orders:    Ambulatory Referral to Dermatology; Future    Bronchospasm  Get coupon through [employer]  Orders:    albuterol (Ventolin HFA) 90 mcg/act inhaler; Inhale 2 puffs every 6 (six) hours as needed for wheezing Pt  will be  getting  copay  coupon through  [employer] to decrease cost           History of Present Illness   Patient presents with:  Eye Problem: Right Eye swelling, mucus production, swollen lymph nodes. 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   HENT:  Positive for congestion, sinus pressure and sinus pain. Negative for ear pain and sore throat.    Eyes:  Positive for pain, redness and itching.   Respiratory:  Negative for shortness of breath.    Cardiovascular:  Negative for chest pain.   Skin:  Positive for color change.        Of  moles       Objective   /80 (BP Location: Right arm, Patient Position: Sitting, Cuff Size: Standard)   Pulse 88   Ht 5' 11\" (1.803 m)   Wt 134 kg (295 lb)   SpO2 97%   BMI 41.14 kg/m²      Physical Exam  Vitals reviewed.   Constitutional:       Appearance: Normal appearance. He is obese.   HENT:      Right Ear: Tympanic membrane " normal.      Left Ear: Tympanic membrane normal.      Nose: Congestion present. No rhinorrhea.      Right Sinus: Maxillary sinus tenderness and frontal sinus tenderness present.      Mouth/Throat:      Pharynx: No oropharyngeal exudate or posterior oropharyngeal erythema.     Eyes:      Comments: R  upper eyelid  swollen and  red  c/w  stye     Cardiovascular:      Rate and Rhythm: Normal rate and regular rhythm.      Pulses: Normal pulses.      Heart sounds: Normal heart sounds.   Pulmonary:      Effort: Pulmonary effort is normal.      Breath sounds: Normal breath sounds.     Skin:     Findings: Lesion present.      Comments: Multiple  skin lesions on back     Neurological:      Mental Status: He is alert.

## 2025-05-16 NOTE — TELEPHONE ENCOUNTER
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 SEVERITY: \"How bad is the itching?\"  (e.g., Scale 1-10; mild, moderate or severe)      Denies   2. ONSET: \"When did the eye symptoms start?\" (e.g., hours or days ago)      5/13/25  3. EYELIDS: \"Are the eyelids swollen?\" If Yes, ask: \"How much?\"      Yes, mild-moderate   4. EYE DISCHARGE: \"Is there any discharge from the eye, or eyelid crusting?\" If Yes, ask: \"How much?\"      Mild drainage, crusting   5. TRIGGER: \"What do you think triggered the allergic reaction?\" (e.g., animal dander, dust, pollen, smoke; eyelash extensions, new eye make-up, tattooed eyeliner)      Cutting grass at work   6. RECURRENT PROBLEM: \"Have you experienced eye allergies before?\" If Yes, ask: \"When was the last time?\" and \"What medicine or treatment worked best in the past?\"      Denies   7. CONTACT LENS: \"Do you wear contacts?\"  \"Disposable or extended wear?\" \"Do you use preservative-free lens solution?\"      Denies   8. OTHER SYMPTOMS: \"Do you have any other symptoms?\" (e.g., eye redness, runny nose)      Swollen lymph notes, darker mucus production   9. PREGNANCY: \"Is there any chance you are pregnant?\" \"When was your last menstrual period?\"      N/A    Protocols used: Eye - Allergy-Adult-[place]    "

## 2025-05-23 ENCOUNTER — CONSULT (OUTPATIENT)
Dept: DERMATOLOGY | Facility: CLINIC | Age: 40
End: 2025-05-23

## 2025-05-23 VITALS — TEMPERATURE: 97.9 F

## 2025-05-23 DIAGNOSIS — D48.9 NEOPLASM OF UNCERTAIN BEHAVIOR: Primary | ICD-10-CM

## 2025-05-23 PROCEDURE — 88342 IMHCHEM/IMCYTCHM 1ST ANTB: CPT | Performed by: STUDENT IN AN ORGANIZED HEALTH CARE EDUCATION/TRAINING PROGRAM

## 2025-05-23 PROCEDURE — 88305 TISSUE EXAM BY PATHOLOGIST: CPT | Performed by: STUDENT IN AN ORGANIZED HEALTH CARE EDUCATION/TRAINING PROGRAM

## 2025-05-23 PROCEDURE — 88341 IMHCHEM/IMCYTCHM EA ADD ANTB: CPT | Performed by: STUDENT IN AN ORGANIZED HEALTH CARE EDUCATION/TRAINING PROGRAM

## 2025-05-30 ENCOUNTER — RESULTS FOLLOW-UP (OUTPATIENT)
Dept: DERMATOLOGY | Facility: CLINIC | Age: 40
End: 2025-05-30

## 2025-05-30 ENCOUNTER — RA CDI HCC (OUTPATIENT)
Dept: OTHER | Facility: HOSPITAL | Age: 40
End: 2025-05-30

## 2025-05-30 PROCEDURE — 88342 IMHCHEM/IMCYTCHM 1ST ANTB: CPT | Performed by: STUDENT IN AN ORGANIZED HEALTH CARE EDUCATION/TRAINING PROGRAM

## 2025-05-30 PROCEDURE — 88341 IMHCHEM/IMCYTCHM EA ADD ANTB: CPT | Performed by: STUDENT IN AN ORGANIZED HEALTH CARE EDUCATION/TRAINING PROGRAM

## 2025-05-30 PROCEDURE — 88305 TISSUE EXAM BY PATHOLOGIST: CPT | Performed by: STUDENT IN AN ORGANIZED HEALTH CARE EDUCATION/TRAINING PROGRAM

## 2025-06-06 ENCOUNTER — OFFICE VISIT (OUTPATIENT)
Dept: FAMILY MEDICINE CLINIC | Facility: CLINIC | Age: 40
End: 2025-06-06

## 2025-06-06 VITALS
OXYGEN SATURATION: 96 % | HEIGHT: 71 IN | BODY MASS INDEX: 41.8 KG/M2 | SYSTOLIC BLOOD PRESSURE: 118 MMHG | WEIGHT: 298.6 LBS | DIASTOLIC BLOOD PRESSURE: 84 MMHG | HEART RATE: 70 BPM

## 2025-06-06 DIAGNOSIS — M51.370 DEGENERATION OF INTERVERTEBRAL DISC OF LUMBOSACRAL REGION WITH DISCOGENIC BACK PAIN: Primary | ICD-10-CM

## 2025-06-06 RX ORDER — MELOXICAM 15 MG/1
15 TABLET ORAL DAILY PRN
Qty: 30 TABLET | Refills: 3 | Status: SHIPPED | OUTPATIENT
Start: 2025-06-06

## 2025-06-06 RX ORDER — CYCLOBENZAPRINE HCL 5 MG
5 TABLET ORAL 3 TIMES DAILY PRN
Qty: 60 TABLET | Refills: 3 | Status: SHIPPED | OUTPATIENT
Start: 2025-06-06

## 2025-06-06 NOTE — PROGRESS NOTES
"Assessment/Plan:    DDD (degenerative disc disease), lumbosacral  Refill Meloxicam and  flexeril       Diagnoses and all orders for this visit:    Degeneration of intervertebral disc of lumbosacral region with discogenic back pain  -     cyclobenzaprine (FLEXERIL) 5 mg tablet; Take 1 tablet (5 mg total) by mouth 3 (three) times a day as needed for muscle spasms  -     meloxicam (MOBIC) 15 mg tablet; Take 1 tablet (15 mg total) by mouth daily as needed for moderate pain          Subjective:      Patient ID: Avelino Espinoza is a 40 y.o. male.    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      Review of Systems   Constitutional:  Negative for activity change, appetite change, fatigue and unexpected weight change.   Respiratory:  Negative for shortness of breath.    Cardiovascular:  Negative for chest pain.   Musculoskeletal:  Positive for arthralgias and back pain.   Neurological:  Negative for dizziness and headaches.         Objective:      /84 (BP Location: Right arm, Patient Position: Sitting, Cuff Size: Extra-Large)   Pulse 70   Ht 5' 11\" (1.803 m)   Wt 135 kg (298 lb 9.6 oz)   SpO2 96%   BMI 41.65 kg/m²          Physical Exam  Vitals reviewed.   Constitutional:       Appearance: Normal appearance. He is obese.     Cardiovascular:      Rate and Rhythm: Normal rate and regular rhythm.      Pulses: Normal pulses.      Heart sounds: Normal heart sounds.   Pulmonary:      Effort: Pulmonary effort is normal.      Breath sounds: Normal " breath sounds.     Musculoskeletal:         General: Tenderness present.      Comments: Mild  tenderness of  lumbar  spine   Lymphadenopathy:      Cervical: No cervical adenopathy.     Neurological:      Mental Status: He is alert.     Psychiatric:         Mood and Affect: Mood normal.

## 2025-07-03 ENCOUNTER — CLINICAL SUPPORT (OUTPATIENT)
Dept: FAMILY MEDICINE CLINIC | Facility: CLINIC | Age: 40
End: 2025-07-03
Payer: COMMERCIAL

## 2025-07-03 DIAGNOSIS — Z23 ENCOUNTER FOR IMMUNIZATION: Primary | ICD-10-CM

## 2025-07-03 PROCEDURE — 90715 TDAP VACCINE 7 YRS/> IM: CPT

## 2025-07-03 PROCEDURE — 90471 IMMUNIZATION ADMIN: CPT

## 2025-07-03 NOTE — PROGRESS NOTES
Here for Tdap update per Dr. Jacobo  Patient tolerated injection well and left office in no distress.